# Patient Record
Sex: MALE | Employment: UNEMPLOYED | URBAN - METROPOLITAN AREA
[De-identification: names, ages, dates, MRNs, and addresses within clinical notes are randomized per-mention and may not be internally consistent; named-entity substitution may affect disease eponyms.]

---

## 2019-07-31 ENCOUNTER — OFFICE VISIT (OUTPATIENT)
Dept: UROLOGY | Age: 54
End: 2019-07-31

## 2019-07-31 VITALS
HEIGHT: 65 IN | DIASTOLIC BLOOD PRESSURE: 80 MMHG | BODY MASS INDEX: 25.49 KG/M2 | HEART RATE: 85 BPM | SYSTOLIC BLOOD PRESSURE: 126 MMHG | WEIGHT: 153 LBS | OXYGEN SATURATION: 95 %

## 2019-07-31 DIAGNOSIS — R97.20 ELEVATED PSA: Primary | ICD-10-CM

## 2019-07-31 RX ORDER — AMLODIPINE BESYLATE 10 MG/1
TABLET ORAL
Refills: 3 | COMMUNITY
Start: 2019-07-28 | End: 2021-02-22

## 2019-07-31 RX ORDER — CHLORTHALIDONE 25 MG/1
TABLET ORAL
Refills: 3 | COMMUNITY
Start: 2019-07-28 | End: 2021-02-22

## 2019-07-31 RX ORDER — METFORMIN HYDROCHLORIDE 500 MG/1
TABLET ORAL
Refills: 3 | COMMUNITY
Start: 2019-07-10 | End: 2021-02-22

## 2019-07-31 RX ORDER — GUAIFENESIN 100 MG/5ML
81 LIQUID (ML) ORAL DAILY
Refills: 0 | COMMUNITY
Start: 2019-07-08 | End: 2020-01-23

## 2019-07-31 RX ORDER — PRAVASTATIN SODIUM 20 MG/1
20 TABLET ORAL
COMMUNITY
Start: 2019-07-28 | End: 2021-02-22

## 2019-07-31 NOTE — PROGRESS NOTES
Mr. Maine Youssef has a reminder for a \"due or due soon\" health maintenance. I have asked that he contact his primary care provider for follow-up on this health maintenance.

## 2019-07-31 NOTE — PATIENT INSTRUCTIONS
Prostate Biopsy: About This Test 
What is it? A prostate biopsy is a type of test. Your doctor takes small tissue samples from your prostate gland. Then another doctor looks at the tissue under a microscope to see if there are cancer cells. This test is done by a doctor who specializes in men's genital and urinary problems (urologist). It can be done in your doctor's office, a day surgery clinic, or a hospital operating room. To get the tissue samples from the prostate, the doctor inserts a thin needle through the rectum, the urethra, or the area between the anus and scrotum (perineum). The most common method is through the rectum. Your doctor may use ultrasound to help guide the needle. Why is this test done? You may need a prostate biopsy if your doctor found something of concern during a digital rectal exam. Or you may need it if a blood test showed a high level of prostate-specific antigen (PSA). A biopsy can help find out if you have prostate cancer. How can you prepare for this test? 
Before you have a prostate biopsy, tell your doctor if you are taking any medicines or using any herbal supplements, or if you are allergic to any medicines. And tell your doctor if you have had bleeding problems, or you take aspirin or some other blood thinner. What happens before the test? 
· You may need to have an enema before the test. 
· You will need to take off all or most of your clothes. You will be given a cloth or paper gown to use during the test. 
· You may be given a sedative through a vein (IV) in your arm. The sedative will help you relax and stay still. What happens during the test? 
Some men have an MRI of the prostate before their biopsy. This helps to find the areas in the prostate to take biopsy samples. If you have an MRI, your doctor will use ultrasound and the MRI results to find the areas to biopsy. Through the rectum · You may be asked to kneel, lie on your side, or lie on your back. · Your doctor may inject an anesthetic around the prostate to numb the area before the sample is taken. · Ultrasound is often used to guide the needle to the correct spot. · Your doctor may choose to use a needle guide for the biopsy. He or she will attach the guide to a finger. Your doctor will insert the finger into your rectum. · The needle will enter the prostate and take 6 to 12 samples. Through the urethra · You will lie on your back. Your feet will rest in stirrups. · You will get anesthesia. The anesthesia may make you sleep. Or it may just numb the area being worked on. 
· Your doctor will insert a lighted scope (cystoscope) into your urethra. The scope allows your doctor to look directly at the prostate. Your doctor will pass a cutting loop through the scope to remove samples of prostate tissue. Through the perineum · You will lie on an exam table either on your side or on your back with your knees bent. You will get anesthesia. The anesthesia may make you sleep. Or it may just numb the area being worked on. 
· Your doctor will make a small cut in your perineum. Then he or she will insert a finger into the rectum to hold the prostate. · Your doctor will then insert the needle through the cut and into the prostate. · The needle collects samples of tissue and is then pulled out. What else should you know about this test? 
· A prostate biopsy has a slight risk of causing problems such as infection or bleeding. · If the biopsy went through your rectum, you may have a small amount of bleeding from your rectum for 2 to 3 days after the biopsy. · You may have a little pain in your pelvic area. You may also have a little blood in your urine for 1 to 5 days. · You may have some blood in your semen for a week or longer. How long will the test take? This test will take about 15 to 45 minutes.  
What happens after the test? 
Your doctor will tell you what to expect and watch for after your test. In general: · If you have anesthesia that makes you sleep, you will be in a recovery room for a few hours. You will need someone to drive you home. You may feel tired for the rest of the day. · You will probably be able to go home right away. · If your doctor had you stop taking any medicine for the biopsy, ask him or her when you can start taking it again. If you were given antibiotics, take them as directed. · Do not do heavy work or exercise for 4 hours after the test. 
· Your doctor will tell you how long it may take to get your results back. Follow-up care is a key part of your treatment and safety. Be sure to make and go to all appointments, and call your doctor if you are having problems. It's also a good idea to keep a list of the medicines you take. Ask your doctor when you can expect to have your test results. Where can you learn more? Go to http://jose-susan.info/. Enter Y504 in the search box to learn more about \"Prostate Biopsy: About This Test.\" Current as of: December 19, 2018 Content Version: 12.1 © 0356-1257 [employer]. Care instructions adapted under license by [employer] (which disclaims liability or warranty for this information). If you have questions about a medical condition or this instruction, always ask your healthcare professional. [address] any warranty or liability for your use of this information.

## 2019-08-01 LAB
BILIRUB UR QL STRIP: NEGATIVE
GLUCOSE UR-MCNC: NEGATIVE MG/DL
KETONES P FAST UR STRIP-MCNC: NEGATIVE MG/DL
PH UR STRIP: 5 [PH] (ref 4.6–8)
PROT UR QL STRIP: NEGATIVE
SP GR UR STRIP: 1.01 (ref 1–1.03)
UA UROBILINOGEN AMB POC: NORMAL (ref 0.2–1)
URINALYSIS CLARITY POC: CLEAR
URINALYSIS COLOR POC: YELLOW
URINE BLOOD POC: NORMAL
URINE LEUKOCYTES POC: NORMAL
URINE NITRITES POC: NEGATIVE

## 2019-08-01 NOTE — PROGRESS NOTES
Maria Isabel Mills 47 y.o. male     Mr. Bre Spence seen today for evaluation of elevated PSA found on routine testing                  PSA 23.2 on 8 July 2019    Patient has no irritative or obstructive voiding symptoms  No history of  tract disease, trauma, or surgery  No family history of prostate malignancy    PSA 23.2 on 8 July 2019    Review of Systems:   CNS: No seizure syncope headaches dizziness or visual changes  Respiratory: No wheezing shortness of breath chest pain or coughing  Cardiovascular: Cardiac arrhythmia angina/hypertension  Intestinal: No dyspepsia diarrhea or constipation  Urinary: Urgency frequency dysuria or gross hematuria  Skeletal: Joint arthritis  Endocrine: Diabetes  Other:    Allergies: No Known Allergies   Medications:    Current Outpatient Medications   Medication Sig Dispense Refill    pravastatin (PRAVACHOL) 20 mg tablet       aspirin 81 mg chewable tablet chew ONE tablet BY MOUTH EVERY DAY  0    amLODIPine (NORVASC) 10 mg tablet TAKE ONE TABLET BY MOUTH EVERY DAY  3    metFORMIN (GLUCOPHAGE) 500 mg tablet TAKE ONE TABLET BY MOUTH EVERY MORNING  3    chlorthalidone (HYGROTEN) 25 mg tablet TAKE ONE TABLET BY MOUTH EVERY DAY  3       Past Medical History:   Diagnosis Date    Diabetes ([facility] [address]     Fast heart beat     Hypertension       History reviewed. No pertinent surgical history. Social History     Socioeconomic History    Marital status: UNKNOWN     Spouse name: Not on file    Number of children: Not on file    Years of education: Not on file    Highest education level: Not on file   Occupational History    Not on file   Social Needs    Financial resource strain: Not on file    Food insecurity:     Worry: Not on file     Inability: Not on file    Transportation needs:     Medical: Not on file     Non-medical: Not on file   Tobacco Use    Smoking status: Former Smoker    Smokeless tobacco: Never Used   Substance and Sexual Activity    Alcohol use:  Yes    Drug use: Never  Sexual activity: Yes   Lifestyle    Physical activity:     Days per week: Not on file     Minutes per session: Not on file    Stress: Not on file   Relationships    Social connections:     Talks on phone: Not on file     Gets together: Not on file     Attends [employer] witness service: Not on file     Active member of club or organization: Not on file     Attends meetings of clubs or organizations: Not on file     Relationship status: Not on file    Intimate partner violence:     Fear of current or ex partner: Not on file     Emotionally abused: Not on file     Physically abused: Not on file     Forced sexual activity: Not on file   Other Topics Concern    Not on file   Social History Narrative    Not on file      History reviewed. No pertinent family history.      Physical Examination: Well-nourished mature male in no apparent distress trim and fit appearing     Neck: No masses nodes or bruits  Lungs: Clear breath sounds bilaterally no wheezes no rales no rhonchi  Heart: Regular sinus rhythm no murmurs no gallops no rubs  Abdomen: Nontender no palpable masses no organomegaly no bruits Back: No percussion CVA tenderness  Skin: Warm and dry no rash no lesions  Neurologic: No motor or sensory deficits in arms or legs  Genitalia: Penis is normal, testes normal bilaterally, prostate by SUSHMA is rounded, smooth, benign consistency nontender-no induration no nodularity  No rectal masses induration or tenderness    Urinalysis: Negative dipstick/++heme/negative nitrite      Impression: Elevated PSA    Plan: Transperineal prostate biopsy    Counseling Note:  Technique a transperineal prostate biopsy has been described discussed with patient including risk of bleeding, infection, and urinary retention    Schedule prostate biopsy at [facility] under general anesthesia-patient's preference    RTC 1 week post op prostate biopsy pathology report       Enrique Amato MD  -electronically signed-    PLEASE NOTE:  This document has been produced using voice recognition software. Unrecognized errors in transcription may be present.

## 2019-08-06 RX ORDER — SODIUM CHLORIDE 9 MG/ML
100 INJECTION, SOLUTION INTRAVENOUS CONTINUOUS
Status: CANCELLED | OUTPATIENT
Start: 2019-08-06

## 2019-08-14 ENCOUNTER — DOCUMENTATION ONLY (OUTPATIENT)
Dept: UROLOGY | Age: 54
End: 2019-08-14

## 2019-08-14 ENCOUNTER — OFFICE VISIT (OUTPATIENT)
Dept: PULMONOLOGY | Age: 54
End: 2019-08-14

## 2019-08-14 ENCOUNTER — HOSPITAL ENCOUNTER (OUTPATIENT)
Dept: PREADMISSION TESTING | Age: 54
Discharge: HOME | End: 2019-08-14
Payer: MEDICAID

## 2019-08-14 VITALS
SYSTOLIC BLOOD PRESSURE: 130 MMHG | TEMPERATURE: 98 F | OXYGEN SATURATION: 100 % | HEART RATE: 73 BPM | RESPIRATION RATE: 19 BRPM | HEIGHT: 65 IN | BODY MASS INDEX: 26.16 KG/M2 | DIASTOLIC BLOOD PRESSURE: 80 MMHG | WEIGHT: 157 LBS

## 2019-08-14 DIAGNOSIS — R00.2 PALPITATIONS: ICD-10-CM

## 2019-08-14 DIAGNOSIS — I20.8 ANGINA OF EFFORT (HCC): ICD-10-CM

## 2019-08-14 DIAGNOSIS — R97.20 ELEVATED PSA: ICD-10-CM

## 2019-08-14 DIAGNOSIS — Z01.818 PREOPERATIVE TESTING: Primary | ICD-10-CM

## 2019-08-14 DIAGNOSIS — Z01.818 PREOPERATIVE TESTING: ICD-10-CM

## 2019-08-14 DIAGNOSIS — R06.09 DYSPNEA ON EXERTION: ICD-10-CM

## 2019-08-14 DIAGNOSIS — J44.9 COPD, GROUP B, BY GOLD 2017 CLASSIFICATION (HCC): Primary | ICD-10-CM

## 2019-08-14 DIAGNOSIS — Z87.891 PERSONAL HISTORY OF TOBACCO USE, PRESENTING HAZARDS TO HEALTH: ICD-10-CM

## 2019-08-14 LAB
ANION GAP SERPL CALC-SCNC: 8 MMOL/L (ref 3–18)
ATRIAL RATE: 76 BPM
BUN SERPL-MCNC: 20 MG/DL (ref 7–18)
BUN/CREAT SERPL: 19 (ref 12–20)
CALCIUM SERPL-MCNC: 9.7 MG/DL (ref 8.5–10.1)
CALCULATED P AXIS, ECG09: 54 DEGREES
CALCULATED R AXIS, ECG10: 47 DEGREES
CALCULATED T AXIS, ECG11: 21 DEGREES
CHLORIDE SERPL-SCNC: 105 MMOL/L (ref 100–111)
CO2 SERPL-SCNC: 27 MMOL/L (ref 21–32)
CREAT SERPL-MCNC: 1.04 MG/DL (ref 0.6–1.3)
DIAGNOSIS, 93000: NORMAL
ERYTHROCYTE [DISTWIDTH] IN BLOOD BY AUTOMATED COUNT: 15.8 % (ref 11.6–14.5)
GLUCOSE SERPL-MCNC: 89 MG/DL (ref 74–99)
HCT VFR BLD AUTO: 34 % (ref 36–48)
HGB BLD-MCNC: 10.9 G/DL (ref 13–16)
MCH RBC QN AUTO: 25.6 PG (ref 24–34)
MCHC RBC AUTO-ENTMCNC: 32.1 G/DL (ref 31–37)
MCV RBC AUTO: 79.8 FL (ref 74–97)
P-R INTERVAL, ECG05: 174 MS
PLATELET # BLD AUTO: 1029 K/UL (ref 135–420)
PMV BLD AUTO: 8.9 FL (ref 9.2–11.8)
POTASSIUM SERPL-SCNC: 4.4 MMOL/L (ref 3.5–5.5)
Q-T INTERVAL, ECG07: 374 MS
QRS DURATION, ECG06: 84 MS
QTC CALCULATION (BEZET), ECG08: 420 MS
RBC # BLD AUTO: 4.26 M/UL (ref 4.7–5.5)
SODIUM SERPL-SCNC: 140 MMOL/L (ref 136–145)
VENTRICULAR RATE, ECG03: 76 BPM
WBC # BLD AUTO: 9.3 K/UL (ref 4.6–13.2)

## 2019-08-14 PROCEDURE — 36415 COLL VENOUS BLD VENIPUNCTURE: CPT

## 2019-08-14 PROCEDURE — 80048 BASIC METABOLIC PNL TOTAL CA: CPT

## 2019-08-14 PROCEDURE — 85027 COMPLETE CBC AUTOMATED: CPT

## 2019-08-14 PROCEDURE — 93005 ELECTROCARDIOGRAM TRACING: CPT

## 2019-08-14 NOTE — PROGRESS NOTES
I called and spoke to patient's primary care's nurse. I notified her that the patient had surgery scheduled for Friday that had to be canceled due to the need for cardiac and pulmonary remy. Now the patient also needs clearance from his primary care for a critical value lab called to us of platelet of [phone]. I faxed over the lab results to the patient's PCP Dr. Lisset Wallace. They will contact patient.   I also called and left voicemail for patient to call me back and I will give him the name of a cardiologist.

## 2019-08-14 NOTE — LETTER
8/18/19 Patient: Aubrie Dior YOB: 1965 Date of Visit: 8/14/2019 Francisco North MD 
[address] [place] [id number] [place] [address] VIA Facsimile: [phone] Dear Francisco North MD, Thank you for referring Mr. Aubrie Dior to [facility] [place] for evaluation. My notes for this consultation are attached. If you have questions, please do not hesitate to call me. I look forward to following your patient along with you. Sincerely, Mariella Macias MD

## 2019-08-14 NOTE — PROGRESS NOTES
[address] [facility], [address] [facility]  Pulmonary, Critical Care, and Sleep Medicine    Pulmonary Office Initial Consultation  Name: Zach King 47 y.o. male  MRN: [id number]  : 1965  Service Date: 19    Referring Provider: Mateus Kwok MD  [address]  [place], [address] [place]   Chief Complaint:   Chief Complaint   Patient presents with   [facility] Referral / Consult     referred by Dr. Silva Bloch for COPD    COPD     History of Present Illness: Zach King is a 47 y.o. male, who presents to Pulmonary clinic referred for COPD. Pt reports some shortness of breath on a daily basis for the last few months. Pt reports sx with strenuous exertion while he's at work - pt works night shift. mMRC of 2. Associated with discomfort/angina, substernal, pressure, 1/10, non-radiating, occurs intermittently. Pt reports that he was seen by the [facility] clinic in [place] and was started on Albuterol PRN, he used it infrequently, used it about 5-6 times last month. It helped with his cough at the time. He only uses it at night. Symptoms are alleviated with rest and mildly with albuterol, no other modifying factors. No issues with chronic cough  Pt had an episode of bronchitis/COPD exacerbation - [facility] to [facility] about 1 month ago. Pt reports palpitations have been occurring on a regular basis with minimal effort at least 3 times a week for the last 4-5 months -- not progressively worsening. Symptoms are self-limited, last a few minutes. No modifying factors. Smoking hx:  Quit 1 month ago, prior 1PPD, started at age 15 -- 36 pack years  Occ Hx: Works at 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       Past Medical Hx: I have personally reviewed medical hx  Past Medical History: Diagnosis Date    Chronic lung disease     [facility]     Diabetes ([facility]     Diabetes mellitus ([facility]     Fast heart beat     periodically    Hypertension        Past Surgical Hx: I have personally reviewed surgical hx  Past Surgical History:   Procedure Laterality Date    HX OTHER SURGICAL      ? cyst  lower back        Family Hx: I have personally reviewed the family hx. No family hx of cancer or hereditary lung disease with immediate family. Family History   Problem Relation Age of Onset    Diabetes Mother     Heart Disease Mother         CHF    Diabetes Sister     Diabetes Brother        Social Hx: I have personally reviewed the social hx.   Social History     Socioeconomic History    Marital status: SINGLE     Spouse name: Not on file    Number of children: Not on file    Years of education: Not on file    Highest education level: Not on file   Occupational History    Not on file   Social Needs    Financial resource strain: Not on file    Food insecurity:     Worry: Not on file     Inability: Not on file    Transportation needs:     Medical: Not on file     Non-medical: Not on file   Tobacco Use    Smoking status: Former Smoker     Packs/day: 1.00     Years: 15.00     Pack years: 15.00     Last attempt to quit: 2019     Years since quittin.6    Smokeless tobacco: Never Used   Substance and Sexual Activity    Alcohol use: Yes     Comment: oc    Drug use: Not Currently     Types: Marijuana    Sexual activity: Yes   Lifestyle    Physical activity:     Days per week: Not on file     Minutes per session: Not on file    Stress: Not on file   Relationships    Social connections:     Talks on phone: Not on file     Gets together: Not on file     Attends Restorationist service: Not on file     Active member of club or organization: Not on file     Attends meetings of clubs or organizations: Not on file     Relationship status: Not on file   Romayne Hoffman Intimate partner violence:     Fear of current or ex partner: Not on file     Emotionally abused: Not on file     Physically abused: Not on file     Forced sexual activity: Not on file   Other Topics Concern    Not on file   Social History Narrative    Not on file       Allergies: I have reviewed the allergy hx  No Known Allergies    Medications:  I have reviewed the patient's medications  Prior to Admission medications    Medication Sig Start Date End Date Taking? Authorizing Provider   pravastatin (PRAVACHOL) 20 mg tablet Take 20 mg by mouth nightly. 7/28/19  Yes Provider, Historical   aspirin 81 mg chewable tablet Take 81 mg by mouth daily. 7/8/19  Yes Provider, Historical   amLODIPine (NORVASC) 10 mg tablet TAKE ONE TABLET BY MOUTH EVERY DAY 7/28/19  Yes Provider, Historical   metFORMIN (GLUCOPHAGE) 500 mg tablet TAKE ONE TABLET BY MOUTH EVERY MORNING 7/10/19  Yes Provider, Historical   chlorthalidone (HYGROTEN) 25 mg tablet TAKE ONE TABLET BY MOUTH EVERY DAY 7/28/19  Yes Provider, Historical       Immunizations:  I have reviewed the patient's immunizations    There is no immunization history on file for this patient. Review of Systems:  A complete review of systems was performed as stated in the HPI, all others are negative.       Objective:    Physical Exam:  /80 (BP 1 Location: Left arm, BP Patient Position: At rest)   Pulse 73   Temp 98 °F (36.7 °C) (Oral)   Resp 19   Ht 5' 5\" (1.651 m)   Wt 71.2 kg (157 lb)   SpO2 100%   BMI 26.13 kg/m²   Vitals were personally reviewed  Gen: no acute distress, pleasant and cooperative, sitting up in chair, able to climb to exam table w/o difficulty  HEENT: normocephalic/atraumatic, PERRLA, EOMI, no scleral icterus, nasal turbinates have no erythema, no nasal polyps, no oral lesions, fair dentition, Mallampati III  Neck: supple, trachea midline, no JVD, no cervical and supraclavicular adenopathy  Chest: no lesions, with even rise and fall, no pectus excavatum or flail chest  CVS: regular rate rhythm, S1/S2, no murmurs/rubs/gallops  Lungs: good air entry B/L, CTABL, no wheezes/rales/rhonchi  Back: no kyphosis or scoliosis  Abdomen: soft, nontender, bowel sounds present, no hepatosplenomegaly  Ext: no pitting edema B/L, no peripheral cyanosis or clubbing  Neuro: grossly normal, AAOx3, normal strength and coordination grossly, no focal deficits  Skin: no rashes, erythema, lesions  Psych: normal memory, thought content, and processing    Labs: I have reviewed the patient's available labs  Labs from 6/12/2019 reviewed from care everywhere, CBC shows mild anemia with hemoglobin of 10.3, white count was elevated 19.5, platelet count severely elevated at 1776. Review of prior platelet counts show normal platelet count on 7/95/6553, then progressively elevated between 8/4/2018 and 12/26/2018 from 500 to 1600    Outside records reviewed in clinic as follows: Patient last seen his PCP, Dr. Alvaro Ball on 7/8/2019. Patient presented for hospital discharge follow-up. For COPD, patient was started on Ventolin and referred to pulmonary for further work-up. Imaging:  I have personally reviewed patient's imaging --none on file. Reviewed CT report done at [facility] from 6/11/2019, reporting bronchitis and underlying emphysema, with no pulmonary embolism  Official report per radiology:  Result Impression     No pulmonary embolism. Bronchitis. Underlying emphysema. Staghorn calculus right kidney has grown since 2014. Signed By: Héctor Ritchie MD on 6/11/2019 11:03 AM   Result Narrative   EXAM: CTA CHEST PULMONARY EMBOLISM     CLINICAL INDICATION/HISTORY: Chest pain, acute, PE suspected, high pretest prob patient presents with cough and cold-like symptoms for 7 to 8 days. Chest pain radiating to the arm. Low oxygen saturation on room air.     COMPARISON: Chest x-ray same day    TECHNIQUE: CTA of the chest was performed using timing optimized for pulmonary embolism technique, with 100 cc of Omnipaque 350 IV contrast.  To maximize sensitivity the sagittal and coronal reconstructions were created using a 3D multislice MIP (maximal intensity projection) methodology. CT scans at this facility are performed using dose optimization technique as appropriate to the performed exam, to include automated exposure control, adjustment of the mA and/or kV according to patient size (including appropriate matching for site specific examinations), or use of iterative reconstruction technique. FINDINGS:  Pulmonary arteries: No filling defects are appreciated within the main, lobar or visualized segmental pulmonary arteries to suggest embolism. There is patient motion that diminishes the sensitivity of this test for detection of abnormalities. Aorta and other cardiovascular structures: No aortic aneurysm or dissection. Heart Strain assessment:  -  RV/LV ratio (normal <0.9): Normal  -  Dysfunction or bowing of interventricular septum: None  -  Main pulmonary artery enlargement (>30mm): Not present  -  There  is not visualization of contrast reflux from the right heart into the IVC/hepatic veins. Lung/Airway:  No areas of dense consolidation. There is significant thickening of the central peribronchovascular interstitium around the jackson there are multiple partially opacified bronchi in right middle lobe, right lower lobe, left upper lobe. Paraseptal emphysema at the apices. Pleura:  No pleural effusion. Lymph nodes: Multiple lymph nodes in the middle mediastinum, both jackson, few prevascular lymph nodes. None pathologically enlarged. Nonenlarged bilateral axillary lymph nodes. Chest wall and lower neck: Negative. Upper abdominal structures: There is a large branching stone upper pole right kidney measuring 27 mm units and averaging 337-548 Hounsfield units. . The left kidney appears larger than the right. Bones: Negative.    Other Result Information   Interface, [employer] [facility] - 06/11/2019 11:05 AM EDT  EXAM: CTA CHEST PULMONARY EMBOLISM     CLINICAL INDICATION/HISTORY: Chest pain, acute, PE suspected, high pretest prob patient presents with cough and cold-like symptoms for 7 to 8 days. Chest pain radiating to the arm. Low oxygen saturation on room air. COMPARISON: Chest x-ray same day    TECHNIQUE: CTA of the chest was performed using timing optimized for pulmonary embolism technique, with 100 cc of Omnipaque 350 IV contrast.  To maximize sensitivity the sagittal and coronal reconstructions were created using a 3D multislice MIP (maximal intensity projection) methodology. CT scans at this facility are performed using dose optimization technique as appropriate to the performed exam, to include automated exposure control, adjustment of the mA and/or kV according to patient size (including appropriate matching for site specific examinations), or use of iterative reconstruction technique. FINDINGS:  Pulmonary arteries: No filling defects are appreciated within the main, lobar or visualized segmental pulmonary arteries to suggest embolism. There is patient motion that diminishes the sensitivity of this test for detection of abnormalities. Aorta and other cardiovascular structures: No aortic aneurysm or dissection. Heart Strain assessment:  -  RV/LV ratio (normal <0.9): Normal  -  Dysfunction or bowing of interventricular septum: None  -  Main pulmonary artery enlargement (>30mm): Not present  -  There  is not visualization of contrast reflux from the right heart into the IVC/hepatic veins. Lung/Airway:  No areas of dense consolidation. There is significant thickening of the central peribronchovascular interstitium around the jackson there are multiple partially opacified bronchi in right middle lobe, right lower lobe, left upper lobe. Paraseptal emphysema at the apices. Pleura:  No pleural effusion.     Lymph nodes: Multiple lymph nodes in the middle mediastinum, both jackson, few prevascular lymph nodes. None pathologically enlarged. Nonenlarged bilateral axillary lymph nodes. Chest wall and lower neck: Negative. Upper abdominal structures: There is a large branching stone upper pole right kidney measuring 27 mm units and averaging 337-548 Hounsfield units. . The left kidney appears larger than the right. Bones: Negative. IMPRESSION    No pulmonary embolism. Bronchitis. Underlying emphysema. Staghorn calculus right kidney has grown since 2014. Signed By: Kellen Casanova MD on 6/11/2019 11:03 AM       PFTs:  I have reviewed the patient's PFTs done today in clinic, spirometry is normal.  No BD response. TTE:  I have reviewed the patient's TTE results, done at [facility] on 6/12/2019  Result Impression   :   NORMAL GLOBAL LEFT VENTRICULAR SYSTOLIC FUNCTION. NORMAL LEFT VENTRICULAR EJECTION   FRACTION 65%. NO WALL MOTION ABNORMALITIES. NORMAL DIASTOLIC FUNCTION. NORMAL PULMONARY ARTERY PRESSURE OF   21MMHG. NO HEMODYNAMICALLY SIGNIFICANT VALVULAR PATHOLOGY. NO PREVIOUS REPORT FOR COMPARISON.        Clinical Indications: Heart Failure, Initial evaluation of known or suspected (systolic or diastolic)   ICD Codes: Technical Quality: Adequate     MEASUREMENTS:   2D ECHO    RV Diameter                       3.4 ET                7.2-8.5   LV Diastolic Diameter Base LX     4.9 UA                0.9-9.3   LV Systolic Diameter Base RP      6.0 FA                5.1-6.4   IVS Diastolic Thickness           0.8 cm                0.6-1.1 cm   LVPW Diastolic [id number]          7.5 HL                3.3-8.3 cm   LA Systolic Diameter LX           3.4 cm                2.1-3.7 cm   Aortic Root Diameter              3 FC                  0.9-1.1 cm   LA Systolic VYACJVRE              62.1 mmHg   Ascending Aorta Diameter          3.1 cm   LA Area 4C View                   17.8 cm²   LA Area 2C View                   17.1 cm²   LA Length 4C                      4.3 cm   LA Length 2C                      5.2 cm   LA Volume                         59 cm³       DOPPLER    LVOT Diameter                     2.3 cm   LVOT Area                         4.2 cm²   Mitral E Point Velocity           94 cm/s   Mitral  A Point Velocity          108 cm/s   Mitral A Duration                 108 ms   Mitral E to A Ratio               0.87                  1.0 to 1.5   Mitral E to LV E' Septal Ratio    7.8   Mitral E to LV E' Lateral Ratio   7   MV Deceleration Time              88.5 ms               160-240 ms   Isovolumic Relaxation Time        78 ms                 70-90 ms   Pulm Vein Atrial Reversal Veloci  43.9 cm/s             <35 cm/s   Pulm Vein Atrial Duration         66 ms   E Prime Velocity                  13.5 cm/s   RA Pressure (Entered Value)       3 mmHg   TR Peak Velocity                  2.1 m/s   TR Peak EEYLJHSG                  23.5 mmHg   RV Systolic MLEBYPXW              53.6 mmHg       FINDINGS:     Left Ventricle: Normal left ventricular cavity size. Normal left ventricular wall thickness. Left Ventricle Normal global left ventricular systolic function. Normal left ventricular ejection fraction. Function: No wall motion abnormalities. Normal diastolic function. LVEF: 65 %       Left Atrium: The left atrium is normal in size. Left atrial volume index 33mLm2. Right Ventricle: The right ventricle is normal in size. Normal right ventricular function. Tricuspid Annular Plane Systolic Excursion (TAPSE) is 2.74cm. Tricuspid Annular Plane Systolic   Velocity (TAPSV) is 18.3cm/s. Right Atrium: The right atrium is normal in size. Normal inferior vena cava size and collapse. Mitral Valve: The mitral leaflets are normal.   Trace of mitral regurgitation. No stenosis. Aortic Valve: The aortic valve leaflets are normal. No regurgitation or stenosis. Tricuspid Valve:  The tricuspid valve is structurally normal.   Trace of tricuspid regurgitation. Normal pulmonary artery pressure of   21mmHg. No stenosis. Pulmonic Valve: The pulmonic valve is structurally normal. No regurgitation or stenosis. Aorta: The aorta is normal.   Pericardium: No pericardial effusion. Masses / Shunts: No masses, shunts or thrombi seen. Amparo Larios MD   (Electronically Signed)   Final Date: 12 June 2019 17:43       Assessment and Plan:  47 y.o. male with:    Impression:  1. COPD/emphysema, gold group B, with recent exacerbation  2. Angina and palpitations: Etiology unclear. Patient is higher risk given smoking history  3. Dyspnea on exertion: Etiology due to above, cannot rule out cardiac processes well. 4.  History of tobacco use: Quit in June 2019, prior 1PPD, started at age 15 -- 36 pack years    Plan:  -Given ongoing angina and palpitations, will refer the patient to cardiology at this time for further work-up  -CT lung cancer screening ordered for June 2020. Shared decision-making performed. Advised patient to get yearly until age 78.  -Start Stiolto Respimat 2 puffs once daily, samples given in clinic.  -Continue albuterol as needed  -Advised patient remain active  -Congratulated patient on smoking abstinence      Follow-up and Dispositions    · Return in about 3 months (around 11/14/2019).          Orders Placed This Encounter    AMB POC SPIROMETRY W/BRONCHODILATOR    CT LOW DOSE LUNG CANCER SCREENING    REFERRAL TO CARDIOLOGY    tiotropium-olodaterol ([address]) 2.5-2.5 mcg/actuation inhaler    tiotropium-olodaterol (STIOLTO RESPIMAT) 2.5-2.5 mcg/actuation inhaler         Isael Mccann MD/MPH     Pulmonary, Critical Care Medicine  [facility]

## 2019-08-14 NOTE — PROGRESS NOTES
Phone call from Dhiraj Israel  and [facility], calling in a critical value on patient. Platelet count is 6379. Dr. Danielle Sanchez notified.

## 2019-08-14 NOTE — PROGRESS NOTES
Verbal Order with read back per Marisela Ortiz MD  For PFT smart panel. AMB POC PFT complete w/ bronchodilator    Dr. Helen Crook MD will co-sign the orders.

## 2019-08-14 NOTE — PROGRESS NOTES
I called the patient to notify him we need to cancel his surgery for Friday, 8/16/19. Anesthesia will not put him under until he has a cardiac remy, pulmonary remy and his primary care must clear him because his platelet count was very high. The patient asked for a name of a Cardiologist and I told him I will call him back with a suggestion. Patient verbalized understanding.

## 2019-08-15 ENCOUNTER — DOCUMENTATION ONLY (OUTPATIENT)
Dept: UROLOGY | Age: 54
End: 2019-08-15

## 2019-08-15 NOTE — PROGRESS NOTES
Patient returned my call and I gave him the phone number to call Cardiovascular Specialist at [phone] to make an appointment to have his cardiac clearance. Patient verbalized understanding and stated he would call them and make an appointment.

## 2019-08-15 NOTE — PROGRESS NOTES
I received a call back from patient's primary care office stating that I would need to contact [facility] and alert them of the critical lab value. I called [facility] and spoke with Monica Kevin. I faxed lab to [phone]. Monica Kevin stated that the platelet count of 5161 is actually down from last time. The physician is out of the office but they will make sure the physician sees it tomorrow.

## 2019-08-15 NOTE — PROGRESS NOTES
Received phone call from [facility] with [facility]. She stated the doctor wants the patient to hold his Aspirin for now and to come into their facility and have labs redrawn in order for them to give him a letter of clearance. I call the patient and left a voicemail notifying of above. I asked the patient to please call me back and let me know he got the message. I will try to call the patient back again this afternoon if I have not heard back from him.

## 2019-08-16 ENCOUNTER — DOCUMENTATION ONLY (OUTPATIENT)
Dept: UROLOGY | Age: 54
End: 2019-08-16

## 2019-08-16 NOTE — PROGRESS NOTES
I called the patient and notified him [facility] wants him to hold his Aspirin for now. Patient states he has an appointment with them on Wednesday, 8/21/19 and he will have repeat lab work at that time. Patient also stated he did get my message leaving him the name and number for cardiologist group and he will call them to set up appointment for cardiac clearance. Patient verbalized understanding.

## 2019-09-13 ENCOUNTER — OFFICE VISIT (OUTPATIENT)
Dept: CARDIOLOGY CLINIC | Age: 54
End: 2019-09-13

## 2019-09-13 VITALS
HEART RATE: 82 BPM | DIASTOLIC BLOOD PRESSURE: 66 MMHG | OXYGEN SATURATION: 99 % | WEIGHT: 158 LBS | BODY MASS INDEX: 26.33 KG/M2 | HEIGHT: 65 IN | SYSTOLIC BLOOD PRESSURE: 120 MMHG

## 2019-09-13 DIAGNOSIS — R00.2 PALPITATIONS: ICD-10-CM

## 2019-09-13 DIAGNOSIS — R07.9 CHEST PAIN, UNSPECIFIED TYPE: Primary | ICD-10-CM

## 2019-09-13 NOTE — PATIENT INSTRUCTIONS
Stress echo   Follow up as needed  If you have not heard from the central scheduler to schedule your testing in 48 hours, please call [phone].

## 2019-09-13 NOTE — PROGRESS NOTES
Nyo Easton    CC: Referred by Pulm & Anesthesia for CP and Palpitations/ Preop    HPI    Noy Easton is a 47 y.o. gentleman recently diagnosed with COPD but here due to complaints of continued palpitations and chest pain. As you know patient was recently hospitalized at [facility] for shortness of breath cough and ultimately bronchitis. He had a change of insurance and so is getting established with a lot of new doctors. He has a somewhat complex medical history and much of the details are unknown at the time of my evaluation I understand he has had a work-up for anemia and very high platelets. Part of this work-up he was noted to have a kidney stone that does not seem to be causing obstructive symptoms. Patient tells me that his chest pain and palpitations actually do not bother him much but every time he mentions it to somebody they are making a big deal about it. He says he supposed to have some type of prostate procedure and an anesthesiologist told him he needs to see a cardiologist before they will schedule anything. In terms of patient's chest pain he says it happens almost every day in the center of the chest when he is usually just sitting down. The pain lasts for seconds and there are no other associated symptoms. The pain does not radiate. As a separate issue to this he also has some palpitations. He notices it the most when he gets up in the middle of the night to use the restroom and says he feels like his heart is racing. Really no pounding fluttering or skipping is just these episodes where he feels his heart rate is faster. Says when he was monitored at [facility] he does recall that is being told his heart rate was faster but no significant arrhythmias were noted. He otherwise does not have any edema no dizziness he is never had any syncope shortness of breath and cough are significantly improved after his COPD has been addressed.   Though he himself does not have known coronary disease he does have several CV risk factors I have identified below. His diabetes he says is prediabetes and is a formal new diagnosis    CV RFs; Tobacco abuse (quitting), preDM, HTN    Past Medical History:   Diagnosis Date    Chronic lung disease     [facility]     Diabetes [facility]     Diabetes mellitus ([facility]     Fast heart beat     periodically    Hypertension        Past Surgical History:   Procedure Laterality Date    HX OTHER SURGICAL  April?/    ? cyst  lower back        Current Outpatient Medications   Medication Sig Dispense Refill    tiotropium-olodaterol (STIOLTO RESPIMAT) 2.5-2.5 mcg/actuation inhaler Take 2 Puffs by inhalation daily. 1 Inhaler 0    pravastatin (PRAVACHOL) 20 mg tablet Take 20 mg by mouth nightly.  aspirin 81 mg chewable tablet Take 81 mg by mouth daily.   0    amLODIPine (NORVASC) 10 mg tablet TAKE ONE TABLET BY MOUTH EVERY DAY  3    metFORMIN (GLUCOPHAGE) 500 mg tablet TAKE ONE TABLET BY MOUTH EVERY MORNING  3    chlorthalidone (HYGROTEN) 25 mg tablet TAKE ONE TABLET BY MOUTH EVERY DAY  3       No Known Allergies    Social History     Socioeconomic History    Marital status: SINGLE     Spouse name: Not on file    Number of children: Not on file    Years of education: Not on file    Highest education level: Not on file   Occupational History    Not on file   Social Needs    Financial resource strain: Not on file    Food insecurity:     Worry: Not on file     Inability: Not on file    Transportation needs:     Medical: Not on file     Non-medical: Not on file   Tobacco Use    Smoking status: Former Smoker     Packs/day: 1.00     Years: 15.00     Pack years: 15.00     Last attempt to quit: 2019     Years since quittin.6    Smokeless tobacco: Never Used   Substance and Sexual Activity    Alcohol use: Yes     Comment: oc    Drug use: Not Currently     Types: Marijuana    Sexual activity: Yes   Lifestyle    Physical activity:     Days per week: Not on file     Minutes per session: Not on file    Stress: Not on file   Relationships    Social connections:     Talks on phone: Not on file     Gets together: Not on file     Attends Jew service: Not on file     Active member of club or organization: Not on file     Attends meetings of clubs or organizations: Not on file     Relationship status: Not on file    Intimate partner violence:     Fear of current or ex partner: Not on file     Emotionally abused: Not on file     Physically abused: Not on file     Forced sexual activity: Not on file   Other Topics Concern    Not on file   Social History Narrative    Not on file    Smoking hx:  Quit 1 month ago?, prior 1PPD, started at age 15 -- 36 pack years    FH: neg premature CAD, no SCD, his mother has CHF? Review of Systems    14 pt Review of Systems is negative unless otherwise mentioned in the HPI. Wt Readings from Last 3 Encounters:   09/13/19 71.7 kg (158 lb)   08/14/19 71.2 kg (157 lb)   07/31/19 69.4 kg (153 lb)     Temp Readings from Last 3 Encounters:   08/14/19 98 °F (36.7 °C) (Oral)     BP Readings from Last 3 Encounters:   09/13/19 120/66   08/14/19 130/80   07/31/19 126/80     Pulse Readings from Last 3 Encounters:   09/13/19 82   08/14/19 73   07/31/19 85     Physical Exam:    Visit Vitals  /66   Pulse 82   Ht 5' 5\" (1.651 m)   Wt 71.7 kg (158 lb)   SpO2 99%   BMI 26.29 kg/m²      Physical Exam   Constitutional: He is oriented to person, place, and time. He appears well-developed and well-nourished. HENT:   Head: Normocephalic and atraumatic. Eyes: Pupils are equal, round, and reactive to light. EOM are normal. No scleral icterus. Neck: No JVD present. Cardiovascular: Normal rate, regular rhythm, normal heart sounds and intact distal pulses. Exam reveals no gallop and no friction rub. No murmur heard. Pulmonary/Chest: Effort normal and breath sounds normal. No respiratory distress. He has no wheezes. He has no rales. He exhibits no tenderness. Abdominal: Soft. Bowel sounds are normal.   Musculoskeletal: He exhibits no edema. Neurological: He is alert and oriented to person, place, and time. Skin: Skin is warm and dry. No rash noted. Psychiatric: He has a normal mood and affect. EKG t6/11/19:  Sinus tach 100, normal axis and intervals, nonspecific t wave abn    No results found for: HBA1C, HGBE8, MDG2PKBU, LRN0LUSA, FOQ9EZCN   Lab Results   Component Value Date/Time    Sodium 140 08/14/2019 01:36 PM    Potassium 4.4 08/14/2019 01:36 PM    Chloride 105 08/14/2019 01:36 PM    CO2 27 08/14/2019 01:36 PM    Anion gap 8 08/14/2019 01:36 PM    Glucose 89 08/14/2019 01:36 PM    BUN 20 (H) 08/14/2019 01:36 PM    Creatinine 1.04 08/14/2019 01:36 PM    BUN/Creatinine ratio 19 08/14/2019 01:36 PM    GFR est AA >60 08/14/2019 01:36 PM    GFR est non-AA >60 08/14/2019 01:36 PM    Calcium 9.7 08/14/2019 01:36 PM     Lab Results   Component Value Date/Time    WBC 9.3 08/14/2019 01:36 PM    HGB 10.9 (L) 08/14/2019 01:36 PM    HCT 34.0 (L) 08/14/2019 01:36 PM    PLATELET 1,933 ([facility]) 81/50/8318 01:36 PM    MCV 79.8 08/14/2019 01:36 PM     TTE:  I have reviewed the patient's TTE results, done at [facility] on 6/12/2019  Result Impression   :  NORMAL GLOBAL LEFT VENTRICULAR SYSTOLIC FUNCTION. NORMAL LEFT VENTRICULAR EJECTION  FRACTION 65%. NO WALL MOTION ABNORMALITIES. NORMAL DIASTOLIC FUNCTION. NORMAL PULMONARY ARTERY PRESSURE OF   21MMHG. NO HEMODYNAMICALLY SIGNIFICANT VALVULAR PATHOLOGY. NO PREVIOUS REPORT FOR COMPARISON. LA normal        Impression and Plan: Mary Troy is a 47 y. o. with:    1.) Perioperative risk stratification prior to prostrate procedure  2.) Racing palpitations  3.) Atypical but persistent CP  4.) Normal LV function/ normal LA recent echo 6/2019  5.) COPD, recent exacerbation  6.) preDM  7.) HTN  8.) Tobacco abuse    1.) Stress echocardiogram  2.) Will call with results, if negative can be prn based on if palpitations bothering him    Though symptoms atypical for ACS his risk is elevated for premature CAD due to multiple CV RFs  Though procedure is elective and sounds low risk, due to increased symptoms will ask him to do a stress test    I'm less concerned for significant arrhythmia as he was monitored during hospitalization and nothing besides sinus tachycardia was documented (including ekgs). We can consider holter monitor in the future but at this point I dont think it would change his management (bc even if having PVCs- would be careful adding BB due to his lung issues). Thank you for allowing me to participate in the care of your patient, please do not hesitate to call with questions or concerns.     Kindest Regards,    Joe Ordoñez, DO

## 2019-11-18 ENCOUNTER — OFFICE VISIT (OUTPATIENT)
Dept: PULMONOLOGY | Age: 54
End: 2019-11-18

## 2019-11-18 VITALS
TEMPERATURE: 98.3 F | OXYGEN SATURATION: 98 % | RESPIRATION RATE: 20 BRPM | DIASTOLIC BLOOD PRESSURE: 90 MMHG | WEIGHT: 158 LBS | HEART RATE: 93 BPM | BODY MASS INDEX: 26.33 KG/M2 | HEIGHT: 65 IN | SYSTOLIC BLOOD PRESSURE: 147 MMHG

## 2019-11-18 DIAGNOSIS — F17.210 CIGARETTE NICOTINE DEPENDENCE WITHOUT COMPLICATION: ICD-10-CM

## 2019-11-18 DIAGNOSIS — Z87.891 PERSONAL HISTORY OF TOBACCO USE, PRESENTING HAZARDS TO HEALTH: ICD-10-CM

## 2019-11-18 DIAGNOSIS — R06.09 DYSPNEA ON EXERTION: ICD-10-CM

## 2019-11-18 DIAGNOSIS — J44.9 ASTHMA-COPD OVERLAP SYNDROME (HCC): Primary | ICD-10-CM

## 2019-11-18 DIAGNOSIS — R00.2 PALPITATIONS: ICD-10-CM

## 2019-11-18 RX ORDER — NICOTINE 7MG/24HR
1 PATCH, TRANSDERMAL 24 HOURS TRANSDERMAL EVERY 24 HOURS
Qty: 30 PATCH | Refills: 0 | Status: SHIPPED | OUTPATIENT
Start: 2019-11-18 | End: 2019-12-18

## 2019-11-18 RX ORDER — IBUPROFEN 200 MG
1 TABLET ORAL EVERY 24 HOURS
Qty: 30 PATCH | Refills: 0 | Status: SHIPPED | OUTPATIENT
Start: 2019-11-18 | End: 2019-12-18

## 2019-11-18 RX ORDER — DM/P-EPHED/ACETAMINOPH/DOXYLAM 30-7.5/3
2 LIQUID (ML) ORAL
Qty: 300 LOZENGE | Refills: 1 | Status: SHIPPED | OUTPATIENT
Start: 2019-11-18

## 2019-11-18 NOTE — PROGRESS NOTES
[address] [facility] [address] [place] [address] [facility]  Pulmonary, Critical Care, and Sleep Medicine    Pulmonary Office Follow-Up  Name: Carlos Eduardo Day 47 y.o. male  MRN: [id number]  : 1965  Service Date: 19  Chief Complaint:   Chief Complaint   Patient presents with    COPD    Other     Dyspnea on exertion. History of Present Illness: Carlos Eduardo Day is a 47 y.o. male, who presents to Pulmonary clinic for follow-up of COPD. Patient was last seen in our clinic on 2019. In the interval, patient saw cardiology, ordered a stress echo. He was not contacted by , so he was referred to [facility] cardiology, underwent abnormal stress testing, and then underwent cardiac catheterization on 2019, which was negative. He continues to report palpitations, occurs on a fairly regular basis. Patient underwent Holter monitor testing, he is awaiting the results of that. Patient reports no new issues with his dyspnea. Occurs with strenuous exertion, mMRC of 2. Patient also reports that he has triggers to dyspnea with dust, some coughing with incense, and secondhand marijuana smoke. Patient reports that he used Stiolto for about a month, and then did not get it filled. Patient reports he has not used any controller medications since that point. No issues with sinus drainage  Patient reports in the interval, he relapsed to smoking. He is currently smoking about 1/5PPD  No PRN albuterol use  No exacerbations in the interval.  Denies fevers, chills, nausea, vomiting, diarrhea, angina, night sweats, hemoptysis, sputum, voice hoarseness, LE swelling.     Past Medical History:   Diagnosis Date    Chronic lung disease     Chronic obstructive pulmonary disease ([address]     [facility]     Diabetes mellitus [facility]     Fast heart beat     periodically    Hypertension      Past Surgical History:   Procedure Laterality Date    HX OTHER SURGICAL   ?cyst  lower back      Family History   Problem Relation Age of Onset    Diabetes Mother     Heart Disease Mother         CHF    Diabetes Sister     Diabetes Brother      Social History     Socioeconomic History    Marital status: SINGLE     Spouse name: Not on file    Number of children: Not on file    Years of education: Not on file    Highest education level: Not on file   Occupational History    Not on file   Social Needs    Financial resource strain: Not on file    Food insecurity:     Worry: Not on file     Inability: Not on file    Transportation needs:     Medical: Not on file     Non-medical: Not on file   Tobacco Use    Smoking status: Light Tobacco Smoker     Packs/day: 0.25     Years: 15.00     Pack years: 3.75     Last attempt to quit: 2019     Years since quittin.8    Smokeless tobacco: Never Used   Substance and Sexual Activity    Alcohol use: Yes     Comment: oc    Drug use: Not Currently     Types: Marijuana    Sexual activity: Yes   Lifestyle    Physical activity:     Days per week: Not on file     Minutes per session: Not on file    Stress: Not on file   Relationships    Social connections:     Talks on phone: Not on file     Gets together: Not on file     Attends Church service: Not on file     Active member of club or organization: Not on file     Attends meetings of clubs or organizations: Not on file     Relationship status: Not on file    Intimate partner violence:     Fear of current or ex partner: Not on file     Emotionally abused: Not on file     Physically abused: Not on file     Forced sexual activity: Not on file   Other Topics Concern    Not on file   Social History Narrative    Not on file       Allergies: I have reviewed the allergy hx  No Known Allergies    Medications:  I have reviewed the patient's medications  Prior to Admission medications    Medication Sig Start Date End Date Taking?  Authorizing Provider   tiotropium-olodaterol (STIOLTO RESPIMAT) 2.5-2.5 mcg/actuation inhaler Take 2 Puffs by inhalation daily. 8/14/19   Helen Sotelo MD   pravastatin (PRAVACHOL) 20 mg tablet Take 20 mg by mouth nightly. 7/28/19   Provider, Historical   aspirin 81 mg chewable tablet Take 81 mg by mouth daily. 7/8/19   Provider, Historical   amLODIPine (NORVASC) 10 mg tablet TAKE ONE TABLET BY MOUTH EVERY DAY 7/28/19   Provider, Historical   metFORMIN (GLUCOPHAGE) 500 mg tablet TAKE ONE TABLET BY MOUTH EVERY MORNING 7/10/19   Provider, Historical   chlorthalidone (HYGROTEN) 25 mg tablet TAKE ONE TABLET BY MOUTH EVERY DAY 7/28/19   Provider, Historical       Immunizations:  I have reviewed the patient's immunizations  Immunization History   Administered Date(s) Administered    Tdap 08/22/2012       Review of Systems:  A complete review of systems was performed as stated in the HPI, all others are negative.       Objective:    Physical Exam:  /90 (BP 1 Location: Right arm, BP Patient Position: Sitting)   Pulse 93   Temp 98.3 °F (36.8 °C) (Oral)   Resp 20   Ht 5' 5\" (1.651 m)   Wt 71.7 kg (158 lb)   SpO2 98%   BMI 26.29 kg/m²   Vitals were personally reviewed  Gen: no acute distress, pleasant and cooperative, sitting up in chair, able to climb to exam table w/o difficulty  HEENT: normocephalic/atraumatic, PERRLA, EOMI, no scleral icterus, no oral lesions, poor dentition, Mallampati III  Neck: supple, trachea midline, no JVD, no cervical and supraclavicular adenopathy  Chest: no lesions, with even rise and fall, no pectus excavatum or flail chest  CVS: regular rate rhythm, S1/S2, no murmurs/rubs/gallops  Lungs: good air entry B/L, CTABL, no wheezes/rales/rhonchi  Back: no kyphosis or scoliosis  Abdomen: soft, nontender, bowel sounds present, no hepatosplenomegaly  Ext: no pitting edema B/L, no peripheral cyanosis or clubbing  Neuro: grossly normal, AAOx3, normal strength and coordination grossly, no focal deficits  Skin: no rashes, erythema, lesions  Psych: normal memory, thought content, and processing    Labs: I have reviewed the patient's available labs  Labs from 11/13/2019 reviewed from care everywhere, CBC shows normal white count and hemoglobin, continues to show thrombocytosis with platelet count of 7121. BMP shows elevated glucose of 136, otherwise normal electrolytes and renal function        Imaging:  I have personally reviewed patient's imaging --none on file. PFTs: 8/14/2019: Spirometry is normal.  No BD response. TTE:  I have reviewed the patient's TTE results, done at [facility] on 6/12/2019  Result Impression   :   NORMAL GLOBAL LEFT VENTRICULAR SYSTOLIC FUNCTION. NORMAL LEFT VENTRICULAR EJECTION   FRACTION 65%. NO WALL MOTION ABNORMALITIES. NORMAL DIASTOLIC FUNCTION. NORMAL PULMONARY ARTERY PRESSURE OF   21MMHG. NO HEMODYNAMICALLY SIGNIFICANT VALVULAR PATHOLOGY. NO PREVIOUS REPORT FOR COMPARISON.        Clinical Indications: Heart Failure, Initial evaluation of known or suspected (systolic or diastolic)   ICD Codes: Technical Quality: Adequate     MEASUREMENTS:   2D ECHO    RV Diameter                       3.4 QU                5.3-2.0   LV Diastolic Diameter Base LX     4.9 NI                4.7-7.0   LV Systolic Diameter Base EH      3.2 PM                8.4-5.2   IVS Diastolic Thickness           0.8 cm                0.6-1.1 cm   LVPW Diastolic [id number]          4.5 QB                6.7-0.6 cm   LA Systolic Diameter LX           3.4 cm                2.1-3.7 cm   Aortic Root Diameter              3 FD                  9.0-2.6 cm   LA Systolic FLUPIEFY              42.2 mmHg   Ascending Aorta Diameter          3.1 cm   LA Area 4C View                   17.8 cm²   LA Area 2C View                   17.1 cm²   LA Length 4C                      4.3 cm   LA Length 2C                      5.2 cm   LA Volume                         59 cm³       DOPPLER    LVOT Diameter                     2.3 cm   LVOT Area                         4.2 cm²   Mitral E Point Velocity           94 cm/s   Mitral  A Point Velocity          108 cm/s   Mitral A Duration                 108 ms   Mitral E to A Ratio               0.87                  1.0 to 1.5   Mitral E to LV E' Septal Ratio    7.8   Mitral E to LV E' Lateral Ratio   7   MV Deceleration Time              88.5 ms               160-240 ms   Isovolumic Relaxation Time        78 ms                 70-90 ms   Pulm Vein Atrial Reversal Veloci  43.9 cm/s             <35 cm/s   Pulm Vein Atrial Duration         66 ms   E Prime Velocity                  13.5 cm/s   RA Pressure (Entered Value)       3 mmHg   TR Peak Velocity                  2.1 m/s   TR Peak [id number]                  21.5 mmHg   RV Systolic [id number]              18.2 mmHg       FINDINGS:     Left Ventricle: Normal left ventricular cavity size. Normal left ventricular wall thickness. Left Ventricle Normal global left ventricular systolic function. Normal left ventricular ejection fraction. Function: No wall motion abnormalities. Normal diastolic function. LVEF: 65 %       Left Atrium: The left atrium is normal in size. Left atrial volume index 33mLm2. Right Ventricle: The right ventricle is normal in size. Normal right ventricular function. Tricuspid Annular Plane Systolic Excursion (TAPSE) is 2.74cm. Tricuspid Annular Plane Systolic   Velocity (TAPSV) is 18.3cm/s. Right Atrium: The right atrium is normal in size. Normal inferior vena cava size and collapse. Mitral Valve: The mitral leaflets are normal.   Trace of mitral regurgitation. No stenosis. Aortic Valve: The aortic valve leaflets are normal. No regurgitation or stenosis. Tricuspid Valve: The tricuspid valve is structurally normal.   Trace of tricuspid regurgitation. Normal pulmonary artery pressure of   21mmHg. No stenosis. Pulmonic Valve: The pulmonic valve is structurally normal. No regurgitation or stenosis. Aorta:  The aorta is normal.   Pericardium: No pericardial effusion. Masses / Shunts: No masses, shunts or thrombi seen. Rosa Currie MD   (Electronically Signed)   Final Date: 12 June 2019 17:43   Cardiac catheterization report from 11/13/2019 reviewed  This result has an attachment that is not available. Result Narrative   Kumar Wagner MD     11/13/2019 10:16 AM  [facility]      Cardiac Catheterization Note    Patient: Lesli Emerson   #:  [id number]   [facility]   11/13/2019, 10:15 AM     PROCEDURES:       1. Coronary angiography      PROCEDURE DATE:  11/13/2019    PHYSICIAN:  Fazal Santos MD, MD, [facility]    REFERRING PHYSICIAN:  Aye Calles MD    PRE/POST OPERATIVE DIAGNOSIS:  Chest pain, +ST        ESTIMATED BLOOD LOSS:  09 ml    COMPLICATIONS:  None    SPECIMENS:  None    ASSISTANTS:  None    MEDICATIONS:   Versed, Fentanyl, Local 1% lidocaine, intraarterial Verapamil 2.5   mg, IV heparin. MODERATE SEDATION:  Total intraservice time:6 min  Start time:1005a  End time:1011a  Physician supervising sedation:Dr Merly Monique trained observer was present during the course of   the procedure    HEMOSTASIS:   TR band    PROCEDURAL NOTE:      Risks, benefits, and alternatives to the procedure were discussed   with the patient, who verbalized understanding and consented to   proceed. Time out was performed in the cath lab prior to the   procedure. The rightwrist was prepped and draped in the usual   sterile fashion. A 6French sheath was inserted to the right   radial artery using a micropuncture kit and modified Seldinger   technique. Intra-arterial verapamil administered to prevent   arterial spasm. IV heparin administered to reduce risk of   thrombosis. Preformed catheters were used for coronary   angiography.  All catheters were exchanged over a wire, advanced   under fluoroscopic guidance, carefully aspirated, inspected for   bubbles, and flushed prior to use.  The patient tolerated the   procedure well without complications. TR band applied at the end of procedure  FINDINGS:    CORONARY ANGIOGRAPHY:    1) Left main coronary artery:    normal    2) Left anterior descending coronary artery:  normal    3) Left circumflex coronary artery:nondominant, normal    4) Right coronary artery:  Normal and dominant        IMPRESSION:  1. Normal coronaries    RECOMMENDATIONS:    Hemostasis band removal in the recovery room. Medical treatment and risk factor modification. Thank you for allowing me to participate in his care. Elizabeth Hernandez MD, MD, [facility]  10:15 AM, 11/13/2019       Assessment and Plan:  47 y.o. male with:    Impression:  1. COPD with asthma overlap: Patient has emphysema consistent with COPD, patient also has elevated eosinophil count, 400 on 11/13/2019, 3600 on 6/11/2019 by manual count 900 on 2/5/2019, 1000 on 6/30/2018, and noting specific irritant triggers. No exacerbations in the interval  2. Palpitations: Etiology unclear. Patient had negative cardiac cath on 11/13/2019  3. Dyspnea on exertion: Etiology due to above  4. Nicotine dependence to cigarettes: Patient relapsed, smoking 1/5 pack/day. Prior 1PPD, started at age 15 -- 36 pack years    Plan:  -Work-up of palpitations per cardiology. -Reviewed patient's underlying asthma overlap with COPD. Counseled patient that he will need ICS in addition to controller bronchodilator therapy. Start Asmanex Twisthaler 220 mcg 1 puff once daily, sample given in clinic. Counseled patient to rinse mouth thoroughly after each use. -Restart Stiolto Respimat 2 puffs once daily, sample given in clinic  -Continue albuterol as needed  -Counseled patient to avoid potential triggers of asthma  -Depending on symptoms, may consider addition of LTRA therapy at next visit  -Full PFTs at next visit  -CT lung cancer screening ordered for June 2020. Shared decision-making performed.   Advised patient to get yearly until age 78.  -Advised patient remain active  -Immunizations reviewed, patient declines influenza vaccination  -I spent 12 minutes with patient regarding cessation of smoking cigarettes. I reviewed health risks of tobacco use including increased risk of MI, stroke, cancer, etc.  We reviewed various approaches to cessation including pills, patches, inhaler, gum, weaning self, \"cold turkey\", and smoking cessation classes. Pt was agreeable to cessation -- I prescribed nicotine patch taper as well as PRN nicotine replacement with lozenges q1h PRN      Follow-up and Dispositions    · Return in about 6 months (around 5/18/2020).        Orders Placed This Encounter    AMB POC PFT COMPLETE W/BRONCHODILATOR    AMB POC PFT COMPLETE W/O BRONCHODILATOR    GAS DILUT/WASHOUT LUNG VOL W/WO DISTRIB VENT&VOL    DIFFUSING CAPACITY    tiotropium-olodaterol (STIOLTO RESPIMAT) 2.5-2.5 mcg/actuation inhaler    tiotropium-olodaterol (STIOLTO RESPIMAT) 2.5-2.5 mcg/actuation inhaler    mometasone (ASMANEX TWISTHALER) 220 mcg/ actuation (120) aepb inhaler    mometasone (ASMANEX TWISTHALER) 220 mcg/ actuation (120) aepb inhaler    nicotine (NICODERM CQ) 7 mg/24 hr    nicotine buccal (POLACRILEX) 2 mg lozg lozenge    nicotine (NICODERM CQ) 14 mg/24 hr patch       King Willis MD/MPH     Pulmonary, Critical Care Medicine  [facility]

## 2019-11-18 NOTE — PROGRESS NOTES
Meme Gasca presents today for   Chief Complaint   Patient presents with    COPD    Other     Dyspnea on exertion. Is someone accompanying this pt? No    Is the patient using any DME equipment during OV? No    -DME Company NA    Depression Screening:  3 most recent PHQ Screens 11/18/2019   Little interest or pleasure in doing things Not at all   Feeling down, depressed, irritable, or hopeless Not at all   Total Score PHQ 2 0       Learning Assessment:  Learning Assessment 7/31/2019   PRIMARY LEARNER Patient   BARRIERS PRIMARY LEARNER NONE   CO-LEARNER CAREGIVER No   PRIMARY LANGUAGE ENGLISH   LEARNER PREFERENCE PRIMARY LISTENING   ANSWERED BY patient   RELATIONSHIP SELF       Abuse Screening:  No flowsheet data found. Fall Risk  No flowsheet data found. Coordination of Care:  1. Have you been to the ER, urgent care clinic since your last visit? Hospitalized since your last visit? No    2. Have you seen or consulted any other health care providers outside of the [address] [place] [address] since your last visit? Include any pap smears or colon screening. Dr. George Talley PCP.

## 2019-11-18 NOTE — LETTER
11/18/19 Patient: Stephanie Mckeon YOB: 1965 Date of Visit: 11/18/2019 Richard Pollock MD 
[address] [address] [place] [address] VIA Facsimile: [phone] Dear Richard Pollock MD, Thank you for referring Mr. Stephanie Mckeon to [address] [place] [address] for evaluation. My notes for this consultation are attached. If you have questions, please do not hesitate to call me. I look forward to following your patient along with you. Sincerely, Tk Lerner MD

## 2021-03-31 ENCOUNTER — DOCUMENTATION ONLY (OUTPATIENT)
Dept: PULMONOLOGY | Age: 56
End: 2021-03-31

## 2021-03-31 DIAGNOSIS — R06.02 SOB (SHORTNESS OF BREATH): ICD-10-CM

## 2021-03-31 DIAGNOSIS — R06.09 DYSPNEA ON EXERTION: ICD-10-CM

## 2021-03-31 DIAGNOSIS — J44.9 ASTHMA-COPD OVERLAP SYNDROME (HCC): Primary | ICD-10-CM

## 2021-03-31 DIAGNOSIS — F17.210 CIGARETTE NICOTINE DEPENDENCE WITHOUT COMPLICATION: ICD-10-CM

## 2021-03-31 DIAGNOSIS — J44.9 COPD, GROUP B, BY GOLD 2017 CLASSIFICATION (HCC): ICD-10-CM

## 2021-03-31 DIAGNOSIS — Z87.891 PERSONAL HISTORY OF TOBACCO USE, PRESENTING HAZARDS TO HEALTH: ICD-10-CM

## 2021-03-31 NOTE — PROGRESS NOTES
Order placed for COVID-19 test, per Verbal Order from Dr. Fred Barger on 3/31/2021. Last office visit: 11/18/2019  Follow up Visit: 4/15/2021 (PFT) & 4/16/2021 (Medical Clearance)    Provider is aware of last office visit and follow up. No further action requested from provider.

## 2021-04-06 PROBLEM — R79.89 ELEVATED LFTS: Status: ACTIVE | Noted: 2021-01-22

## 2021-04-06 PROBLEM — D75.839 THROMBOCYTOSIS: Status: ACTIVE | Noted: 2019-02-05

## 2021-04-06 PROBLEM — Z87.442 HX OF RENAL CALCULI: Status: ACTIVE | Noted: 2018-07-20

## 2021-04-06 PROBLEM — C61 PROSTATE CANCER (HCC): Status: ACTIVE | Noted: 2020-03-23

## 2021-04-06 PROBLEM — Z20.822 SUSPECTED COVID-19 VIRUS INFECTION: Status: ACTIVE | Noted: 2021-01-22

## 2021-04-06 PROBLEM — Z00.00 HEALTH CARE MAINTENANCE: Status: ACTIVE | Noted: 2018-07-20

## 2021-04-06 PROBLEM — R31.9 HEMATURIA, UNDIAGNOSED CAUSE: Status: ACTIVE | Noted: 2018-07-20

## 2021-04-06 PROBLEM — J44.1 COPD WITH ACUTE EXACERBATION (HCC): Status: ACTIVE | Noted: 2019-06-11

## 2021-04-06 PROBLEM — E11.65 TYPE 2 DIABETES MELLITUS WITH HYPERGLYCEMIA, WITHOUT LONG-TERM CURRENT USE OF INSULIN (HCC): Status: ACTIVE | Noted: 2020-02-11

## 2021-04-26 ENCOUNTER — DOCUMENTATION ONLY (OUTPATIENT)
Dept: PULMONOLOGY | Age: 56
End: 2021-04-26

## 2021-04-26 NOTE — PROGRESS NOTES
Lf  for pt to see if pt was still coming 4/29 and 4/30 for breathing test and mc; d/t seeing a pulm dr in [facility] was going to talk w/Dr Lucero's office and let us know.; if pt still coming-has pt had covid testing for breathing test?

## 2022-03-18 PROBLEM — J44.1 COPD WITH ACUTE EXACERBATION (HCC): Status: ACTIVE | Noted: 2019-06-11

## 2022-03-18 PROBLEM — Z00.00 HEALTH CARE MAINTENANCE: Status: ACTIVE | Noted: 2018-07-20

## 2022-03-18 PROBLEM — Z20.822 SUSPECTED COVID-19 VIRUS INFECTION: Status: ACTIVE | Noted: 2021-01-22

## 2022-03-19 PROBLEM — R31.9 HEMATURIA, UNDIAGNOSED CAUSE: Status: ACTIVE | Noted: 2018-07-20

## 2022-03-19 PROBLEM — R79.89 ELEVATED LFTS: Status: ACTIVE | Noted: 2021-01-22

## 2022-03-19 PROBLEM — E11.65 TYPE 2 DIABETES MELLITUS WITH HYPERGLYCEMIA, WITHOUT LONG-TERM CURRENT USE OF INSULIN (HCC): Status: ACTIVE | Noted: 2020-02-11

## 2022-03-20 PROBLEM — Z87.442 HX OF RENAL CALCULI: Status: ACTIVE | Noted: 2018-07-20

## 2022-03-20 PROBLEM — C61 PROSTATE CANCER (HCC): Status: ACTIVE | Noted: 2020-03-23

## 2022-03-20 PROBLEM — D75.839 THROMBOCYTOSIS: Status: ACTIVE | Noted: 2019-02-05

## 2024-05-31 ENCOUNTER — APPOINTMENT (OUTPATIENT)
Facility: HOSPITAL | Age: 59
DRG: 433 | End: 2024-05-31
Payer: MEDICARE

## 2024-05-31 ENCOUNTER — HOSPITAL ENCOUNTER (INPATIENT)
Facility: HOSPITAL | Age: 59
LOS: 5 days | Discharge: HOME | DRG: 433 | End: 2024-06-05
Attending: EMERGENCY MEDICINE | Admitting: INTERNAL MEDICINE
Payer: MEDICARE

## 2024-05-31 ENCOUNTER — APPOINTMENT (OUTPATIENT)
Facility: HOSPITAL | Age: 59
DRG: 433 | End: 2024-05-31
Attending: INTERNAL MEDICINE
Payer: MEDICARE

## 2024-05-31 DIAGNOSIS — K72.00 ACUTE LIVER FAILURE WITHOUT HEPATIC COMA: ICD-10-CM

## 2024-05-31 DIAGNOSIS — K70.30 ALCOHOLIC CIRRHOSIS, UNSPECIFIED WHETHER ASCITES PRESENT (HCC): ICD-10-CM

## 2024-05-31 DIAGNOSIS — D64.9 ANEMIA, UNSPECIFIED TYPE: Primary | ICD-10-CM

## 2024-05-31 DIAGNOSIS — K70.31 ASCITES DUE TO ALCOHOLIC CIRRHOSIS (HCC): ICD-10-CM

## 2024-05-31 DIAGNOSIS — R60.0 BILATERAL LOWER EXTREMITY EDEMA: ICD-10-CM

## 2024-05-31 PROBLEM — K72.90 LIVER FAILURE WITHOUT HEPATIC COMA (HCC): Status: ACTIVE | Noted: 2024-05-31

## 2024-05-31 LAB
ALBUMIN FLD-MCNC: <0.6 G/DL
ALBUMIN SERPL-MCNC: 1.8 G/DL (ref 3.4–5)
ALBUMIN/GLOB SERPL: 0.3 (ref 0.8–1.7)
ALP SERPL-CCNC: 128 U/L (ref 45–117)
ALT SERPL-CCNC: 29 U/L (ref 16–61)
AMMONIA PLAS-SCNC: 39 UMOL/L (ref 11–32)
ANION GAP SERPL CALC-SCNC: 12 MMOL/L (ref 3–18)
APAP SERPL-MCNC: <2 UG/ML (ref 10–30)
APPEARANCE FLD: ABNORMAL
AST SERPL-CCNC: 104 U/L (ref 10–38)
BASOPHILS # BLD: 0.1 K/UL (ref 0–0.1)
BASOPHILS NFR BLD: 1 % (ref 0–2)
BILIRUB DIRECT SERPL-MCNC: 1.4 MG/DL (ref 0–0.2)
BILIRUB SERPL-MCNC: 2 MG/DL (ref 0.2–1)
BUN SERPL-MCNC: 5 MG/DL (ref 7–18)
BUN/CREAT SERPL: 4 (ref 12–20)
CALCIUM SERPL-MCNC: 8.4 MG/DL (ref 8.5–10.1)
CHLORIDE SERPL-SCNC: 102 MMOL/L (ref 100–111)
CO2 SERPL-SCNC: 22 MMOL/L (ref 21–32)
COLOR FLD: YELLOW
CREAT SERPL-MCNC: 1.15 MG/DL (ref 0.6–1.3)
DIFFERENTIAL METHOD BLD: ABNORMAL
ECHO BSA: 1.77 M2
EOSINOPHIL # BLD: 0.4 K/UL (ref 0–0.4)
EOSINOPHIL NFR BLD: 4 % (ref 0–5)
EOSINOPHIL NFR FLD MANUAL: 0 %
ERYTHROCYTE [DISTWIDTH] IN BLOOD BY AUTOMATED COUNT: 14.9 % (ref 11.6–14.5)
GLOBULIN SER CALC-MCNC: 6.5 G/DL (ref 2–4)
GLUCOSE SERPL-MCNC: 116 MG/DL (ref 74–99)
HCT VFR BLD AUTO: 21.4 % (ref 36–48)
HEMOCCULT STL QL: NEGATIVE
HGB BLD-MCNC: 7.5 G/DL (ref 13–16)
IMM GRANULOCYTES # BLD AUTO: 0.1 K/UL (ref 0–0.04)
IMM GRANULOCYTES NFR BLD AUTO: 1 % (ref 0–0.5)
INR PPP: 2.1 (ref 0.9–1.1)
LIPASE SERPL-CCNC: 16 U/L (ref 13–75)
LYMPHOCYTES # BLD: 2.9 K/UL (ref 0.9–3.6)
LYMPHOCYTES NFR BLD: 27 % (ref 21–52)
LYMPHOCYTES NFR FLD: 81 %
MCH RBC QN AUTO: 30.1 PG (ref 24–34)
MCHC RBC AUTO-ENTMCNC: 35 G/DL (ref 31–37)
MCV RBC AUTO: 85.9 FL (ref 78–100)
MONOCYTES # BLD: 1.3 K/UL (ref 0.05–1.2)
MONOCYTES NFR BLD: 12 % (ref 3–10)
MONOCYTES NFR FLD: 17 %
NEUTROPHILS NFR FLD: 2 % (ref 0–25)
NEUTS BAND # FLD: 0 %
NEUTS SEG # BLD: 6.2 K/UL (ref 1.8–8)
NEUTS SEG NFR BLD: 56 % (ref 40–73)
NRBC # BLD: 0 K/UL (ref 0–0.01)
NRBC BLD-RTO: 0 PER 100 WBC
NT PRO BNP: 95 PG/ML (ref 0–900)
NUC CELL # FLD: 109 /CU MM
PLATELET # BLD AUTO: 559 K/UL (ref 135–420)
PMV BLD AUTO: 9.5 FL (ref 9.2–11.8)
POTASSIUM SERPL-SCNC: 3.4 MMOL/L (ref 3.5–5.5)
PROT FLD-MCNC: <2 G/DL
PROT SERPL-MCNC: 8.3 G/DL (ref 6.4–8.2)
PROTHROMBIN TIME: 23.7 SEC (ref 11.9–14.7)
RBC # BLD AUTO: 2.49 M/UL (ref 4.35–5.65)
RBC # FLD: <2000 /CU MM
SODIUM SERPL-SCNC: 136 MMOL/L (ref 136–145)
SPECIMEN SOURCE FLD: ABNORMAL
SPECIMEN SOURCE FLD: NORMAL
SPECIMEN SOURCE FLD: NORMAL
TROPONIN I SERPL HS-MCNC: 7 NG/L (ref 0–78)
TSH SERPL DL<=0.05 MIU/L-ACNC: 3.01 UIU/ML (ref 0.36–3.74)
WBC # BLD AUTO: 11 K/UL (ref 4.6–13.2)

## 2024-05-31 PROCEDURE — 6370000000 HC RX 637 (ALT 250 FOR IP): Performed by: INTERNAL MEDICINE

## 2024-05-31 PROCEDURE — 94761 N-INVAS EAR/PLS OXIMETRY MLT: CPT

## 2024-05-31 PROCEDURE — 80143 DRUG ASSAY ACETAMINOPHEN: CPT

## 2024-05-31 PROCEDURE — 99285 EMERGENCY DEPT VISIT HI MDM: CPT

## 2024-05-31 PROCEDURE — 2709999900 US GUIDED PARACENTESIS

## 2024-05-31 PROCEDURE — 96374 THER/PROPH/DIAG INJ IV PUSH: CPT

## 2024-05-31 PROCEDURE — 85610 PROTHROMBIN TIME: CPT

## 2024-05-31 PROCEDURE — 6360000004 HC RX CONTRAST MEDICATION: Performed by: EMERGENCY MEDICINE

## 2024-05-31 PROCEDURE — 80076 HEPATIC FUNCTION PANEL: CPT

## 2024-05-31 PROCEDURE — 93970 EXTREMITY STUDY: CPT

## 2024-05-31 PROCEDURE — 6370000000 HC RX 637 (ALT 250 FOR IP): Performed by: PHYSICIAN ASSISTANT

## 2024-05-31 PROCEDURE — 89051 BODY FLUID CELL COUNT: CPT

## 2024-05-31 PROCEDURE — 93970 EXTREMITY STUDY: CPT | Performed by: INTERNAL MEDICINE

## 2024-05-31 PROCEDURE — 71045 X-RAY EXAM CHEST 1 VIEW: CPT

## 2024-05-31 PROCEDURE — 74177 CT ABD & PELVIS W/CONTRAST: CPT

## 2024-05-31 PROCEDURE — 82140 ASSAY OF AMMONIA: CPT

## 2024-05-31 PROCEDURE — 82272 OCCULT BLD FECES 1-3 TESTS: CPT

## 2024-05-31 PROCEDURE — 2580000003 HC RX 258: Performed by: INTERNAL MEDICINE

## 2024-05-31 PROCEDURE — 93005 ELECTROCARDIOGRAM TRACING: CPT | Performed by: EMERGENCY MEDICINE

## 2024-05-31 PROCEDURE — 80048 BASIC METABOLIC PNL TOTAL CA: CPT

## 2024-05-31 PROCEDURE — 1100000000 HC RM PRIVATE

## 2024-05-31 PROCEDURE — 85025 COMPLETE CBC W/AUTO DIFF WBC: CPT

## 2024-05-31 PROCEDURE — 0W9G3ZZ DRAINAGE OF PERITONEAL CAVITY, PERCUTANEOUS APPROACH: ICD-10-PCS | Performed by: STUDENT IN AN ORGANIZED HEALTH CARE EDUCATION/TRAINING PROGRAM

## 2024-05-31 PROCEDURE — 83690 ASSAY OF LIPASE: CPT

## 2024-05-31 PROCEDURE — 6360000002 HC RX W HCPCS: Performed by: INTERNAL MEDICINE

## 2024-05-31 PROCEDURE — 99223 1ST HOSP IP/OBS HIGH 75: CPT | Performed by: INTERNAL MEDICINE

## 2024-05-31 PROCEDURE — 76705 ECHO EXAM OF ABDOMEN: CPT

## 2024-05-31 PROCEDURE — 6360000002 HC RX W HCPCS: Performed by: EMERGENCY MEDICINE

## 2024-05-31 PROCEDURE — 82042 OTHER SOURCE ALBUMIN QUAN EA: CPT

## 2024-05-31 PROCEDURE — 84157 ASSAY OF PROTEIN OTHER: CPT

## 2024-05-31 PROCEDURE — 84484 ASSAY OF TROPONIN QUANT: CPT

## 2024-05-31 PROCEDURE — 84443 ASSAY THYROID STIM HORMONE: CPT

## 2024-05-31 PROCEDURE — 83880 ASSAY OF NATRIURETIC PEPTIDE: CPT

## 2024-05-31 RX ORDER — ACETAMINOPHEN 325 MG/1
650 TABLET ORAL EVERY 6 HOURS PRN
Status: DISCONTINUED | OUTPATIENT
Start: 2024-05-31 | End: 2024-06-05 | Stop reason: HOSPADM

## 2024-05-31 RX ORDER — SODIUM CHLORIDE 0.9 % (FLUSH) 0.9 %
5-40 SYRINGE (ML) INJECTION EVERY 12 HOURS SCHEDULED
Status: DISCONTINUED | OUTPATIENT
Start: 2024-05-31 | End: 2024-06-05 | Stop reason: HOSPADM

## 2024-05-31 RX ORDER — MORPHINE SULFATE 4 MG/ML
4 INJECTION, SOLUTION INTRAMUSCULAR; INTRAVENOUS
Status: COMPLETED | OUTPATIENT
Start: 2024-05-31 | End: 2024-05-31

## 2024-05-31 RX ORDER — SODIUM CHLORIDE 0.9 % (FLUSH) 0.9 %
5-40 SYRINGE (ML) INJECTION PRN
Status: DISCONTINUED | OUTPATIENT
Start: 2024-05-31 | End: 2024-06-05 | Stop reason: HOSPADM

## 2024-05-31 RX ORDER — MULTIVIT-MIN/FERROUS GLUCONATE 9 MG/15 ML
15 LIQUID (ML) ORAL DAILY
Status: DISCONTINUED | OUTPATIENT
Start: 2024-05-31 | End: 2024-06-05 | Stop reason: HOSPADM

## 2024-05-31 RX ORDER — LORAZEPAM 1 MG/1
2 TABLET ORAL
Status: DISCONTINUED | OUTPATIENT
Start: 2024-05-31 | End: 2024-06-05 | Stop reason: HOSPADM

## 2024-05-31 RX ORDER — ACETAMINOPHEN 650 MG/1
650 SUPPOSITORY RECTAL EVERY 6 HOURS PRN
Status: DISCONTINUED | OUTPATIENT
Start: 2024-05-31 | End: 2024-06-05 | Stop reason: HOSPADM

## 2024-05-31 RX ORDER — MORPHINE SULFATE 2 MG/ML
2 INJECTION, SOLUTION INTRAMUSCULAR; INTRAVENOUS EVERY 4 HOURS PRN
Status: DISCONTINUED | OUTPATIENT
Start: 2024-05-31 | End: 2024-06-05 | Stop reason: HOSPADM

## 2024-05-31 RX ORDER — LACTULOSE 10 G/15ML
20 SOLUTION ORAL 2 TIMES DAILY
Status: DISCONTINUED | OUTPATIENT
Start: 2024-05-31 | End: 2024-06-01

## 2024-05-31 RX ORDER — GAUZE BANDAGE 2" X 2"
100 BANDAGE TOPICAL DAILY
Status: DISCONTINUED | OUTPATIENT
Start: 2024-05-31 | End: 2024-06-05 | Stop reason: HOSPADM

## 2024-05-31 RX ORDER — FOLIC ACID 1 MG/1
1 TABLET ORAL DAILY
Status: DISCONTINUED | OUTPATIENT
Start: 2024-05-31 | End: 2024-06-05 | Stop reason: HOSPADM

## 2024-05-31 RX ORDER — POTASSIUM CHLORIDE 7.45 MG/ML
10 INJECTION INTRAVENOUS PRN
Status: DISCONTINUED | OUTPATIENT
Start: 2024-05-31 | End: 2024-06-05 | Stop reason: HOSPADM

## 2024-05-31 RX ORDER — LORAZEPAM 1 MG/1
4 TABLET ORAL
Status: DISCONTINUED | OUTPATIENT
Start: 2024-05-31 | End: 2024-06-05 | Stop reason: HOSPADM

## 2024-05-31 RX ORDER — ONDANSETRON 2 MG/ML
4 INJECTION INTRAMUSCULAR; INTRAVENOUS EVERY 6 HOURS PRN
Status: DISCONTINUED | OUTPATIENT
Start: 2024-05-31 | End: 2024-06-05 | Stop reason: HOSPADM

## 2024-05-31 RX ORDER — ENOXAPARIN SODIUM 100 MG/ML
40 INJECTION SUBCUTANEOUS DAILY
Status: DISCONTINUED | OUTPATIENT
Start: 2024-05-31 | End: 2024-06-05 | Stop reason: HOSPADM

## 2024-05-31 RX ORDER — LANOLIN ALCOHOL/MO/W.PET/CERES
100 CREAM (GRAM) TOPICAL DAILY
Status: DISCONTINUED | OUTPATIENT
Start: 2024-05-31 | End: 2024-05-31 | Stop reason: CLARIF

## 2024-05-31 RX ORDER — LORAZEPAM 2 MG/ML
3 INJECTION INTRAMUSCULAR
Status: DISCONTINUED | OUTPATIENT
Start: 2024-05-31 | End: 2024-06-05 | Stop reason: HOSPADM

## 2024-05-31 RX ORDER — MAGNESIUM SULFATE 1 G/100ML
1000 INJECTION INTRAVENOUS ONCE
Status: COMPLETED | OUTPATIENT
Start: 2024-05-31 | End: 2024-05-31

## 2024-05-31 RX ORDER — LORAZEPAM 1 MG/1
1 TABLET ORAL
Status: DISCONTINUED | OUTPATIENT
Start: 2024-05-31 | End: 2024-06-05 | Stop reason: HOSPADM

## 2024-05-31 RX ORDER — SODIUM CHLORIDE 9 MG/ML
INJECTION, SOLUTION INTRAVENOUS PRN
Status: DISCONTINUED | OUTPATIENT
Start: 2024-05-31 | End: 2024-06-05 | Stop reason: HOSPADM

## 2024-05-31 RX ORDER — LORAZEPAM 2 MG/ML
1 INJECTION INTRAMUSCULAR
Status: DISCONTINUED | OUTPATIENT
Start: 2024-05-31 | End: 2024-06-05 | Stop reason: HOSPADM

## 2024-05-31 RX ORDER — LORAZEPAM 2 MG/ML
2 INJECTION INTRAMUSCULAR
Status: DISCONTINUED | OUTPATIENT
Start: 2024-05-31 | End: 2024-06-05 | Stop reason: HOSPADM

## 2024-05-31 RX ORDER — POTASSIUM CHLORIDE 20 MEQ/1
40 TABLET, EXTENDED RELEASE ORAL PRN
Status: DISCONTINUED | OUTPATIENT
Start: 2024-05-31 | End: 2024-06-05 | Stop reason: HOSPADM

## 2024-05-31 RX ORDER — POLYETHYLENE GLYCOL 3350 17 G/17G
17 POWDER, FOR SOLUTION ORAL DAILY PRN
Status: DISCONTINUED | OUTPATIENT
Start: 2024-05-31 | End: 2024-06-05 | Stop reason: HOSPADM

## 2024-05-31 RX ORDER — MAGNESIUM SULFATE IN WATER 40 MG/ML
2000 INJECTION, SOLUTION INTRAVENOUS PRN
Status: DISCONTINUED | OUTPATIENT
Start: 2024-05-31 | End: 2024-06-05 | Stop reason: HOSPADM

## 2024-05-31 RX ORDER — LORAZEPAM 2 MG/ML
4 INJECTION INTRAMUSCULAR
Status: DISCONTINUED | OUTPATIENT
Start: 2024-05-31 | End: 2024-06-05 | Stop reason: HOSPADM

## 2024-05-31 RX ORDER — ONDANSETRON 4 MG/1
4 TABLET, ORALLY DISINTEGRATING ORAL EVERY 8 HOURS PRN
Status: DISCONTINUED | OUTPATIENT
Start: 2024-05-31 | End: 2024-06-05 | Stop reason: HOSPADM

## 2024-05-31 RX ORDER — LORAZEPAM 1 MG/1
3 TABLET ORAL
Status: DISCONTINUED | OUTPATIENT
Start: 2024-05-31 | End: 2024-06-05 | Stop reason: HOSPADM

## 2024-05-31 RX ORDER — AMLODIPINE BESYLATE 5 MG/1
5 TABLET ORAL DAILY
Status: DISCONTINUED | OUTPATIENT
Start: 2024-05-31 | End: 2024-06-05 | Stop reason: HOSPADM

## 2024-05-31 RX ADMIN — SODIUM CHLORIDE, PRESERVATIVE FREE 10 ML: 5 INJECTION INTRAVENOUS at 20:43

## 2024-05-31 RX ADMIN — MORPHINE SULFATE 2 MG: 2 INJECTION, SOLUTION INTRAMUSCULAR; INTRAVENOUS at 20:50

## 2024-05-31 RX ADMIN — FOLIC ACID 1 MG: 1 TABLET ORAL at 15:47

## 2024-05-31 RX ADMIN — AMLODIPINE BESYLATE 5 MG: 5 TABLET ORAL at 15:46

## 2024-05-31 RX ADMIN — ENOXAPARIN SODIUM 40 MG: 100 INJECTION SUBCUTANEOUS at 16:19

## 2024-05-31 RX ADMIN — LACTULOSE 20 G: 20 SOLUTION ORAL at 20:43

## 2024-05-31 RX ADMIN — POTASSIUM BICARBONATE 40 MEQ: 782 TABLET, EFFERVESCENT ORAL at 15:45

## 2024-05-31 RX ADMIN — Medication 100 MG: at 15:47

## 2024-05-31 RX ADMIN — MAGNESIUM SULFATE HEPTAHYDRATE 1000 MG: 10 INJECTION, SOLUTION INTRAVENOUS at 15:47

## 2024-05-31 RX ADMIN — IOPAMIDOL 80 ML: 612 INJECTION, SOLUTION INTRAVENOUS at 06:01

## 2024-05-31 RX ADMIN — MORPHINE SULFATE 4 MG: 4 INJECTION, SOLUTION INTRAMUSCULAR; INTRAVENOUS at 04:50

## 2024-05-31 RX ADMIN — PHYTONADIONE 5 MG: 10 INJECTION, EMULSION INTRAMUSCULAR; INTRAVENOUS; SUBCUTANEOUS at 16:19

## 2024-05-31 RX ADMIN — Medication 15 ML: at 15:49

## 2024-05-31 ASSESSMENT — PAIN DESCRIPTION - DESCRIPTORS
DESCRIPTORS: SHARP
DESCRIPTORS: ACHING
DESCRIPTORS: SHARP
DESCRIPTORS: STABBING

## 2024-05-31 ASSESSMENT — PAIN DESCRIPTION - ONSET
ONSET: PROGRESSIVE
ONSET: ON-GOING
ONSET: PROGRESSIVE

## 2024-05-31 ASSESSMENT — PAIN DESCRIPTION - PAIN TYPE
TYPE: ACUTE PAIN

## 2024-05-31 ASSESSMENT — PAIN DESCRIPTION - LOCATION
LOCATION: ABDOMEN
LOCATION: LEG

## 2024-05-31 ASSESSMENT — PAIN SCALES - GENERAL
PAINLEVEL_OUTOF10: 3
PAINLEVEL_OUTOF10: 8
PAINLEVEL_OUTOF10: 0
PAINLEVEL_OUTOF10: 7
PAINLEVEL_OUTOF10: 8
PAINLEVEL_OUTOF10: 5
PAINLEVEL_OUTOF10: 3
PAINLEVEL_OUTOF10: 0

## 2024-05-31 ASSESSMENT — PAIN DESCRIPTION - FREQUENCY
FREQUENCY: CONTINUOUS

## 2024-05-31 ASSESSMENT — PAIN DESCRIPTION - ORIENTATION
ORIENTATION: RIGHT
ORIENTATION: LEFT;RIGHT

## 2024-05-31 ASSESSMENT — PAIN - FUNCTIONAL ASSESSMENT
PAIN_FUNCTIONAL_ASSESSMENT: ACTIVITIES ARE NOT PREVENTED
PAIN_FUNCTIONAL_ASSESSMENT: 0-10
PAIN_FUNCTIONAL_ASSESSMENT: ACTIVITIES ARE NOT PREVENTED

## 2024-05-31 NOTE — ED TRIAGE NOTES
Patient brought in by EMS via stretcher to Room 9. Patient complains of chest pain x 1 hour.  Patient has mid chest pain that is reproducible when pressed. Bilateral edema lower legs x 2 days. Abdominal distention x 2 days. History of DM and HTN. Edema b legs x 2 days. Diarrhea x 2 days. Vital signs within normal range: 126/92, 97HR. IV 20g Right AC.    Vs 126/92, 97, IV 20g r AC

## 2024-05-31 NOTE — PROGRESS NOTES
4 Eyes Skin Assessment     NAME:  Luc Patel  YOB: 1965  MEDICAL RECORD NUMBER:  [id number]    The patient is being assessed for  Admission    I agree that at least one RN has performed a thorough Head to Toe Skin Assessment on the patient. ALL assessment sites listed below have been assessed.      Areas assessed by both nurses:    Sacrum. Buttock, Coccyx, Ischium        Does the Patient have a Wound? No noted wound(s)       Clif Prevention initiated by RN: No  Wound Care Orders initiated by RN: No    Pressure Injury (Stage 3,4, Unstageable, DTI, NWPT, and Complex wounds) if present, place Wound referral order by RN under : No    New Ostomies, if present place, Ostomy referral order under : No     Nurse 1 eSignature: Electronically signed by Audelia Valiente RN on 5/31/24 at 3:43 PM EDT    **SHARE this note so that the co-signing nurse can place an eSignature**    Nurse 2 eSignature: Electronically signed by Tabby Khan RN on 5/31/24 at 3:45 PM EDT

## 2024-05-31 NOTE — CONSULTS
GASTROENTEROLOGY CONSULT        Impression:   1. Decompensated alcoholic cirrhosis of liver with ascites absence hepatic encephalopathy   MELD-Na: 20 at 5/31/2024   Negative hepatoma on CT  2. Portal hypertension  3. RUQ pain  4. Alcohol use disorder  5. Elevated LFTs  6. Anemia, normocytic without overt GI bleed; heme negative  7. Mild coagulopathy  8. Hyperammonemia  9. Hypoalbuminemia  10. Tachycardia      Plan:     1. Diagnostic and therapeutic paracentesis with fluid analysis as ordered. AFP ordered.  2. Will tentatively plan for EGD next week prior to hospital discharge.  3. Colonoscopy at outpatient.  4. Start Xifaxan 400 mg TID. Lactulose ordered. Titrate to achieve no more than 2-3 soft stools a day. Hold for 3 days if diarrhea.  5. Vitamin K noted. Mg repletion noted  6. Nonselective betablocker prefer such as nadolol or carvedilol given to keep heart rate above 55 bpm patient has portal hypertension.  7. Low sodium diet. Nutritional support and multivitamins. Dietitian evaluation for nutrition screening - consult placed  8. Iron profile, CBC, CMP, INR in the morning.  9. Naltrexone or baclofen as appropriate in additional to CIWA protocol to help achieve/maintain alcohol cessation  10. No NSAIDs. Acetaminophen up to 2000 mg daily is safe.  11. Medical management otherwise per primary team.        Chief Complaint: swelling in legs, RUQ pain      HPI:  Luc Patel is a 59 y.o. male who I am being asked to see in consultation for an opinion regarding decompensated alcohol cirrhosis of liver. Patient arrived to [facility] ED for evaluation of worsening leg swelling and pain in RUQ that he describes a rib pain. No nausea, vomiting. His ED work-up noted NSR on ECG, elevated LFTs and anemia. He has heme negative stool. CT scan with finding of cirrhosis and stigmata of portal venous hypertension including ascites and varices. He was admitted for inpatient management. Patient mentions that for most his life he has

## 2024-05-31 NOTE — ED PROVIDER NOTES
EMERGENCY DEPARTMENT HISTORY AND PHYSICAL EXAM      Date: 5/31/2024  Patient Name: Luc Patel      History of Presenting Illness     No chief complaint on file.      Location/Duration/Severity/Modifying factors   No chief complaint on file.      HPI:  Luc Patel is a 59 y.o. male with PMH significant for daily alcohol abuse, 6 beers per day, type 2 diabetes mellitus, staghorn calculus of the right kidney, hepatic steatosis hypertension, COPD presents w via EMS ith 1 week of recently worsening swelling to the legs, abdomen, right-sided upper abdominal  pain.  Tonight noticed central chest pain while ambulating approximately starting 1 hour ago.  Pain is now improving.  Right upper quadrant pain still present.  Pt  denies nausea or vomiting, prior history of liver disease or heart issues.    PCP: Berenice Steinberg MD    Current Facility-Administered Medications   Medication Dose Route Frequency Provider Last Rate Last Admin    iopamidol (ISOVUE-300) 61 % injection 80 mL  80 mL IntraVENous ONCE PRN Michel Burnett DO         Current Outpatient Medications   Medication Sig Dispense Refill    ALLOPURINOL PO Take by mouth      albuterol sulfate HFA (PROVENTIL;VENTOLIN;PROAIR) 108 (90 Base) MCG/ACT inhaler Inhale 2 puffs into the lungs every 4 hours as needed      amLODIPine (NORVASC) 5 MG tablet take 1 tablet by mouth once daily      diazePAM (VALIUM) 10 MG tablet Take 1 tablet by mouth 1 hour prior to imaging (Patient not taking: Reported on 5/31/2024)      ergocalciferol (ERGOCALCIFEROL) 1.25 MG (66510 UT) capsule Take 50,000 Units by mouth every 7 days (Patient not taking: Reported on 5/31/2024)      mometasone (ASMANEX) 220 MCG/ACT AEPB inhaler Inhale 220 mcg into the lungs daily (Patient not taking: Reported on 5/31/2024)      nicotine polacrilex (COMMIT) 2 MG lozenge Take 2 mg by mouth (Patient not taking: Reported on 5/31/2024)      sertraline (ZOLOFT) 25 MG tablet Take 25 mg by mouth daily (Patient

## 2024-05-31 NOTE — H&P
History and Physical    Patient: Luc Patel MRN: [id number]  SSN: [id number]    YOB: 1965    Age: 59 y.o.  Sex: male    Berenice Steinberg MD    C/C : Abdominal pain leg edema    Subjective:      Luc Patel is a 59 y.o. male with PMH of chronic alcoholism smoking now being admitted with increasing abdominal girth and bilateral lower extremity edema.    59-year-old male in ED at [facility], denies any other past medical history except mentioned above, had a fall ground-level, now having pain right upper quadrant rib cage area, no local injury bleeding.  Patient chronic alcoholic, and a smoker.  Does not give any exact time when it started he noticed both his legs are swelling his abdomen is distended and tense this is his first time noticing it.  Denies any fever chills no abdominal pain.  Having diarrhea for last 3 to 4 days but no blood or mucus.     Patient denies any chest pain mild discomfort while breathing due to distention of abdomen, but no respiratory distress noted.            Past Medical History:   Diagnosis Date    Chronic lung disease     Chronic obstructive pulmonary disease (HCC)     Diabetes (HCC)     Diabetes mellitus (HCC)     Elevated PSA     Fast heart beat     periodically    Hematuria     Hypertension     Prostate cancer (HCC)     Prostate disease     Staghorn kidney stones      Past Surgical History:   Procedure Laterality Date    CT BONE MARROW BIOPSY  2/15/2019    CT BONE MARROW BIOPSY 2/15/2019    IR BIOPSY PERC SUPERF BONE      OTHER SURGICAL HISTORY      ?cyst  lower back       Family History   Problem Relation Age of Onset    Diabetes Sister     Heart Disease Mother         CHF    Diabetes Mother     Diabetes Brother      Social History     Tobacco Use    Smoking status: Every Day     Current packs/day: 0.00     Types: Cigarettes     Last attempt to quit: 2019     Years since quittin.4    Smokeless tobacco: Never   Substance Use Topics    Alcohol  use: Not Currently     Alcohol/week: 12.0 standard drinks of alcohol     Types: 6 Cans of beer, 6 Shots of liquor per week     Comment: drinks every day      Prior to Admission medications    Medication Sig Start Date End Date Taking? Authorizing Provider   ALLOPURINOL PO Take by mouth    Automatic Reconciliation, Ar   albuterol sulfate HFA (PROVENTIL;VENTOLIN;PROAIR) 108 (90 Base) MCG/ACT inhaler Inhale 2 puffs into the lungs every 4 hours as needed 2/10/21   Automatic Reconciliation, Ar   amLODIPine (NORVASC) 5 MG tablet take 1 tablet by mouth once daily 5/3/21   Automatic Reconciliation, Ar   diazePAM (VALIUM) 10 MG tablet Take 1 tablet by mouth 1 hour prior to imaging  Patient not taking: Reported on 5/31/2024 11/30/22   Automatic Reconciliation, Ar   ergocalciferol (ERGOCALCIFEROL) 1.25 MG (87204 UT) capsule Take 50,000 Units by mouth every 7 days  Patient not taking: Reported on 5/31/2024 8/31/22   Automatic Reconciliation, Ar   mometasone (ASMANEX) 220 MCG/ACT AEPB inhaler Inhale 220 mcg into the lungs daily  Patient not taking: Reported on 5/31/2024 11/18/19   Automatic Reconciliation, Ar   nicotine polacrilex (COMMIT) 2 MG lozenge Take 2 mg by mouth  Patient not taking: Reported on 5/31/2024 11/18/19   Automatic Reconciliation, Ar   sertraline (ZOLOFT) 25 MG tablet Take 25 mg by mouth daily  Patient not taking: Reported on 5/31/2024 10/10/22   Automatic Reconciliation, Ar   tiotropium-olodaterol (STIOLTO) 2.5-2.5 MCG/ACT AERS Inhale 2 puffs into the lungs daily  Patient not taking: Reported on 5/31/2024 11/18/19   Automatic Reconciliation, Ar        No Known Allergies    Review of Systems:  positive responses in bold type   Constitutional: Negative for fever, chills, diaphoresis and unexpected weight change.   HENT: Negative for ear pain, congestion, sore throat, rhinorrhea, drooling, trouble swallowing, neck pain and tinnitus.   Eyes: Negative for photophobia, pain, redness and visual disturbance.

## 2024-05-31 NOTE — ED NOTES
.TRANSFER - OUT REPORT:    Verbal report given to BRENT Win on Luc Patel  being transferred to  for continuum of care.    Report consisted of patient's Situation, Background, Assessment and   Recommendations(SBAR).     Information from the following report(s) Nurse Handoff Report was reviewed with the receiving nurse.    [place] Fall Assessment:    Presents to emergency department  because of falls (Syncope, seizure, or loss of consciousness): No  Age > 70: No  Altered Mental Status, Intoxication with alcohol or substance confusion (Disorientation, impaired judgment, poor safety awaremess, or inability to follow instructions): No  Impaired Mobility: Ambulates or transfers with assistive devices or assistance; Unable to ambulate or transer.: No  Nursing Judgement: Yes          Lines:   Peripheral IV 05/31/24 Distal;Right;Anterior Cephalic (Active)       Peripheral IV 05/31/24 Left Antecubital (Active)        Opportunity for questions and clarification was provided.

## 2024-05-31 NOTE — PLAN OF CARE
Problem: Pain  Goal: Verbalizes/displays adequate comfort level or baseline comfort level  5/31/2024 1732 by Audelia Valiente RN  Outcome: Progressing  5/31/2024 1527 by Audelia Valiente RN  Outcome: Progressing  Flowsheets (Taken 5/31/2024 1315)  Verbalizes/displays adequate comfort level or baseline comfort level:   Encourage patient to monitor pain and request assistance   Assess pain using appropriate pain scale   Administer analgesics based on type and severity of pain and evaluate response   Implement non-pharmacological measures as appropriate and evaluate response   Notify Licensed Independent Practitioner if interventions unsuccessful or patient reports new pain   Consider cultural and social influences on pain and pain management     Problem: Safety - Adult  Goal: Free from fall injury  5/31/2024 1732 by Audelia Valiente RN  Outcome: Progressing  5/31/2024 1527 by Audelia Valiente RN  Outcome: Progressing     Problem: Chronic Conditions and Co-morbidities  Goal: Patient's chronic conditions and co-morbidity symptoms are monitored and maintained or improved  5/31/2024 1732 by Audelia Valiente RN  Outcome: Progressing  5/31/2024 1527 by Audelia Valiente RN  Outcome: Progressing  Flowsheets (Taken 5/31/2024 1320)  Care Plan - Patient's Chronic Conditions and Co-Morbidity Symptoms are Monitored and Maintained or Improved:   Monitor and assess patient's chronic conditions and comorbid symptoms for stability, deterioration, or improvement   Collaborate with multidisciplinary team to address chronic and comorbid conditions and prevent exacerbation or deterioration   Update acute care plan with appropriate goals if chronic or comorbid symptoms are exacerbated and prevent overall improvement and discharge

## 2024-05-31 NOTE — CONSULTS
Diabetes Mother     Diabetes Brother        Allergy:   No Known Allergies    Patient Active Problem List   Diagnosis    COPD with acute exacerbation (HCC)    HTN (hypertension), malignant    Suspected COVID-19 virus infection    Chest pain radiating to arm    Health care maintenance    Hematuria, undiagnosed cause    Elevated LFTs    Type 2 diabetes mellitus with hyperglycemia, without long-term current use of insulin (HCC)    Thrombocytosis    Tobacco abuse    Hx of renal calculi    Hypokalemia    Prostate cancer (HCC)    Liver failure without hepatic coma (HCC)    Ascites due to alcoholic cirrhosis (HCC)       Home Medications:     Prior to Admission medications    Medication Sig Start Date End Date Taking? Authorizing Provider   ALLOPURINOL PO Take by mouth    Automatic Reconciliation, Ar   albuterol sulfate HFA (PROVENTIL;VENTOLIN;PROAIR) 108 (90 Base) MCG/ACT inhaler Inhale 2 puffs into the lungs every 4 hours as needed 2/10/21   Automatic Reconciliation, Ar   amLODIPine (NORVASC) 5 MG tablet take 1 tablet by mouth once daily 5/3/21   Automatic Reconciliation, Ar   diazePAM (VALIUM) 10 MG tablet Take 1 tablet by mouth 1 hour prior to imaging  Patient not taking: Reported on 5/31/2024 11/30/22   Automatic Reconciliation, Ar   ergocalciferol (ERGOCALCIFEROL) 1.25 MG (54616 UT) capsule Take 50,000 Units by mouth every 7 days  Patient not taking: Reported on 5/31/2024 8/31/22   Automatic Reconciliation, Ar   mometasone (ASMANEX) 220 MCG/ACT AEPB inhaler Inhale 220 mcg into the lungs daily  Patient not taking: Reported on 5/31/2024 11/18/19   Automatic Reconciliation, Ar   nicotine polacrilex (COMMIT) 2 MG lozenge Take 2 mg by mouth  Patient not taking: Reported on 5/31/2024 11/18/19   Automatic Reconciliation, Ar   sertraline (ZOLOFT) 25 MG tablet Take 25 mg by mouth daily  Patient not taking: Reported on 5/31/2024 10/10/22   Automatic Reconciliation, Ar   tiotropium-olodaterol (STIOLTO) 2.5-2.5 MCG/ACT AERS Inhale  2 puffs into the lungs daily  Patient not taking: Reported on 5/31/2024 11/18/19   Automatic Reconciliation, Ar       Review of Systems:     On CIWA  BP (!) 147/91   Pulse 94   Temp 97.2 °F (36.2 °C) (Oral)   Resp 18   Ht 1.651 m (5' 5\")   Wt 68.3 kg (150 lb 8 oz)   SpO2 100%   BMI 25.04 kg/m²     Physical Assessment:     constitutional: appearance: well developed, well nourished, normal habitus, no deformities, in no acute distress.   skin: inspection: no rashes, ulcers, icterus or other lesions; no clubbing or telangiectasias. palpation: no induration or subcutaneos nodules.   eyes: inspection: normal conjunctivae and lids; no jaundice pupils: normal  ENMT: mouth: normal oral mucosa,lips and gums; good dentition. oropharynx: normal tongue, hard and soft palate; posterior pharynx without erithema, exudate or lesions.   neck: thyroid: normal size, consistency and position; no masses or tenderness.   respiratory: effort: normal chest excursion; no intercostal retraction or accessory muscle use.   cardiovascular: abdominal aorta: normal size and position; no bruits. palpation: PMI of normal size and position; normal rhythm; no thrill or murmurs.   abdominal: abdomen: normal consistency; no tenderness or masses. hernias: no hernias appreciated. liver: normal size and consistency. spleen: not palpable.        Basic Metabolic Profile   Recent Labs     05/31/24 0421      K 3.4*      CO2 22   BUN 5*         CBC w/Diff    Recent Labs     05/31/24 0421   WBC 11.0   RBC 2.49*   HGB 7.5*   HCT 21.4*   MCV 85.9   MCH 30.1   MCHC 35.0   RDW 14.9*   *   MPV 9.5         Coags   Recent Labs     05/31/24 0421   INR 2.1*           Damaso Reaves MD.   [facility]  [phone]

## 2024-05-31 NOTE — ED NOTES
Assumed care of patient. Patient is resting with eyes closed and easily roused. Mirtha hugger applied to patient. Skin integrity intact, but dry. Patient endorses a taunt, distended stomach and bilateral + 2 pitting edema. No apparent distress with patient.

## 2024-05-31 NOTE — PROCEDURES
RADIOLOGY POST PARACENTESIS NOTE     May 31, 2024       2:54 PM     Preoperative Diagnosis:   Ascites    Postoperative Diagnosis:  Same.    :  PURA Adam    Attending: Deric Petit MD     Assistant:  None.    Type of Anesthesia: 1% plain lidocaine    Procedure/Description:  US-Guided paracentesis    Findings:  Using ultrasound guidance the largest pocket of peritoneal fluid was localized and marked at the right lower quadrant.   The patient was prepped and draped in the usual fashion.  1% Lidocaine was infiltrated locally. A 5 Lao over the needle catheter was advanced into the peritoneal cavity and clear, yellow fluid was aspirated. Once fluid was easily aspirated, the needle was removed leaving the catheter in place. The catheter was connected to vacuum containers and ascitic fluid was removed.    Estimated blood Loss:  Minimal    Specimen Removed:  yes    Complications: None    Condition: Stable    Impression: Successful paracentesis. 4.2 L ascites removed.    Discharge Plan:  continue present therapy    PURA Adam

## 2024-06-01 LAB
ALBUMIN SERPL-MCNC: 1.7 G/DL (ref 3.4–5)
ALBUMIN/GLOB SERPL: 0.3 (ref 0.8–1.7)
ALP SERPL-CCNC: 130 U/L (ref 45–117)
ALT SERPL-CCNC: 28 U/L (ref 16–61)
ANION GAP SERPL CALC-SCNC: 6 MMOL/L (ref 3–18)
AST SERPL-CCNC: 101 U/L (ref 10–38)
BASOPHILS # BLD: 0.1 K/UL (ref 0–0.1)
BASOPHILS NFR BLD: 1 % (ref 0–2)
BILIRUB SERPL-MCNC: 1.6 MG/DL (ref 0.2–1)
BUN SERPL-MCNC: 5 MG/DL (ref 7–18)
BUN/CREAT SERPL: 5 (ref 12–20)
C DIFF GDH STL QL: NEGATIVE
C DIFF TOX A+B STL QL IA: NEGATIVE
C DIFF TOXIN INTERPRETATION: NORMAL
CALCIUM SERPL-MCNC: 8.3 MG/DL (ref 8.5–10.1)
CHLORIDE SERPL-SCNC: 102 MMOL/L (ref 100–111)
CO2 SERPL-SCNC: 28 MMOL/L (ref 21–32)
CREAT SERPL-MCNC: 1.03 MG/DL (ref 0.6–1.3)
DIFFERENTIAL METHOD BLD: ABNORMAL
EOSINOPHIL # BLD: 0.5 K/UL (ref 0–0.4)
EOSINOPHIL NFR BLD: 4 % (ref 0–5)
ERYTHROCYTE [DISTWIDTH] IN BLOOD BY AUTOMATED COUNT: 14.5 % (ref 11.6–14.5)
FERRITIN SERPL-MCNC: 1517 NG/ML (ref 8–388)
GLOBULIN SER CALC-MCNC: 5.9 G/DL (ref 2–4)
GLUCOSE SERPL-MCNC: 98 MG/DL (ref 74–99)
HCT VFR BLD AUTO: 21.9 % (ref 36–48)
HGB BLD-MCNC: 7.4 G/DL (ref 13–16)
IMM GRANULOCYTES # BLD AUTO: 0 K/UL (ref 0–0.04)
IMM GRANULOCYTES NFR BLD AUTO: 0 % (ref 0–0.5)
INR PPP: 2 (ref 0.9–1.1)
IRON SATN MFR SERPL: 114 % (ref 20–50)
IRON SERPL-MCNC: 91 UG/DL (ref 50–175)
LYMPHOCYTES # BLD: 3.9 K/UL (ref 0.9–3.6)
LYMPHOCYTES NFR BLD: 31 % (ref 21–52)
MAGNESIUM SERPL-MCNC: 1.4 MG/DL (ref 1.6–2.6)
MCH RBC QN AUTO: 28.7 PG (ref 24–34)
MCHC RBC AUTO-ENTMCNC: 33.8 G/DL (ref 31–37)
MCV RBC AUTO: 84.9 FL (ref 78–100)
MONOCYTES # BLD: 1.4 K/UL (ref 0.05–1.2)
MONOCYTES NFR BLD: 11 % (ref 3–10)
NEUTS SEG # BLD: 6.8 K/UL (ref 1.8–8)
NEUTS SEG NFR BLD: 54 % (ref 40–73)
NRBC # BLD: 0 K/UL (ref 0–0.01)
NRBC BLD-RTO: 0 PER 100 WBC
PLATELET # BLD AUTO: 565 K/UL (ref 135–420)
PMV BLD AUTO: 10.2 FL (ref 9.2–11.8)
POTASSIUM SERPL-SCNC: 4.2 MMOL/L (ref 3.5–5.5)
PROT SERPL-MCNC: 7.6 G/DL (ref 6.4–8.2)
PROTHROMBIN TIME: 23.2 SEC (ref 11.9–14.7)
RBC # BLD AUTO: 2.58 M/UL (ref 4.35–5.65)
SODIUM SERPL-SCNC: 136 MMOL/L (ref 136–145)
TIBC SERPL-MCNC: 80 UG/DL (ref 250–450)
WBC # BLD AUTO: 12.8 K/UL (ref 4.6–13.2)

## 2024-06-01 PROCEDURE — 85025 COMPLETE CBC W/AUTO DIFF WBC: CPT

## 2024-06-01 PROCEDURE — 83550 IRON BINDING TEST: CPT

## 2024-06-01 PROCEDURE — 36415 COLL VENOUS BLD VENIPUNCTURE: CPT

## 2024-06-01 PROCEDURE — 2580000003 HC RX 258: Performed by: INTERNAL MEDICINE

## 2024-06-01 PROCEDURE — 82107 ALPHA-FETOPROTEIN L3: CPT

## 2024-06-01 PROCEDURE — 99232 SBSQ HOSP IP/OBS MODERATE 35: CPT | Performed by: EMERGENCY MEDICINE

## 2024-06-01 PROCEDURE — 1100000000 HC RM PRIVATE

## 2024-06-01 PROCEDURE — 97161 PT EVAL LOW COMPLEX 20 MIN: CPT

## 2024-06-01 PROCEDURE — 83735 ASSAY OF MAGNESIUM: CPT

## 2024-06-01 PROCEDURE — 6360000002 HC RX W HCPCS: Performed by: INTERNAL MEDICINE

## 2024-06-01 PROCEDURE — 6370000000 HC RX 637 (ALT 250 FOR IP): Performed by: PHYSICIAN ASSISTANT

## 2024-06-01 PROCEDURE — 85610 PROTHROMBIN TIME: CPT

## 2024-06-01 PROCEDURE — 6370000000 HC RX 637 (ALT 250 FOR IP): Performed by: INTERNAL MEDICINE

## 2024-06-01 PROCEDURE — 80053 COMPREHEN METABOLIC PANEL: CPT

## 2024-06-01 PROCEDURE — 94761 N-INVAS EAR/PLS OXIMETRY MLT: CPT

## 2024-06-01 PROCEDURE — 82728 ASSAY OF FERRITIN: CPT

## 2024-06-01 PROCEDURE — 87449 NOS EACH ORGANISM AG IA: CPT

## 2024-06-01 PROCEDURE — 83540 ASSAY OF IRON: CPT

## 2024-06-01 PROCEDURE — 87324 CLOSTRIDIUM AG IA: CPT

## 2024-06-01 RX ADMIN — PHYTONADIONE 5 MG: 10 INJECTION, EMULSION INTRAMUSCULAR; INTRAVENOUS; SUBCUTANEOUS at 09:59

## 2024-06-01 RX ADMIN — MORPHINE SULFATE 2 MG: 2 INJECTION, SOLUTION INTRAMUSCULAR; INTRAVENOUS at 17:41

## 2024-06-01 RX ADMIN — ENOXAPARIN SODIUM 40 MG: 100 INJECTION SUBCUTANEOUS at 08:22

## 2024-06-01 RX ADMIN — Medication 100 MG: at 08:21

## 2024-06-01 RX ADMIN — SODIUM CHLORIDE, PRESERVATIVE FREE 10 ML: 5 INJECTION INTRAVENOUS at 10:00

## 2024-06-01 RX ADMIN — SODIUM CHLORIDE, PRESERVATIVE FREE 10 ML: 5 INJECTION INTRAVENOUS at 10:01

## 2024-06-01 RX ADMIN — Medication 15 ML: at 08:21

## 2024-06-01 RX ADMIN — SODIUM CHLORIDE, PRESERVATIVE FREE 10 ML: 5 INJECTION INTRAVENOUS at 22:04

## 2024-06-01 RX ADMIN — AMLODIPINE BESYLATE 5 MG: 5 TABLET ORAL at 08:21

## 2024-06-01 RX ADMIN — LACTULOSE 20 G: 20 SOLUTION ORAL at 08:22

## 2024-06-01 RX ADMIN — SODIUM CHLORIDE, PRESERVATIVE FREE 10 ML: 5 INJECTION INTRAVENOUS at 22:03

## 2024-06-01 RX ADMIN — FOLIC ACID 1 MG: 1 TABLET ORAL at 08:21

## 2024-06-01 ASSESSMENT — PAIN SCALES - GENERAL
PAINLEVEL_OUTOF10: 0
PAINLEVEL_OUTOF10: 7
PAINLEVEL_OUTOF10: 0
PAINLEVEL_OUTOF10: 0

## 2024-06-01 ASSESSMENT — PAIN DESCRIPTION - LOCATION: LOCATION: LEG

## 2024-06-01 ASSESSMENT — PAIN DESCRIPTION - ORIENTATION: ORIENTATION: LEFT

## 2024-06-01 ASSESSMENT — PAIN DESCRIPTION - DESCRIPTORS: DESCRIPTORS: THROBBING

## 2024-06-01 NOTE — CARE COORDINATION
06/01/24 1043   Service Assessment   Patient Orientation Alert and Oriented   Cognition Alert   History Provided By Patient   Primary Caregiver Self   Accompanied By/Relationship none   Support Systems Family Members;Friends/Neighbors   Patient's Healthcare Decision Maker is: Patient Declined (Legal Next of Kin Remains as Decision Maker)   PCP Verified by CM Yes   Last Visit to PCP Within last 6 months   Prior Functional Level Independent in ADLs/IADLs   Current Functional Level Independent in ADLs/IADLs   Can patient return to prior living arrangement Yes   Ability to make needs known: Good   Family able to assist with home care needs: Yes   Would you like for me to discuss the discharge plan with any other family members/significant others, and if so, who? No   Financial Resources Medicare   Community Resources None   Social/Functional History   Lives With Family   Home Layout One level   Home Access Stairs to enter with rails   Entrance Stairs - Number of Steps 4 steps   Entrance Stairs - Rails Both   Bathroom Shower/Tub Tub/Shower unit   Bathroom Toilet Standard   Bathroom Equipment None   Bathroom Accessibility Accessible   Home Equipment Cane   Receives Help From Family   ADL Assistance Independent   Homemaking Assistance Independent   Homemaking Responsibilities No   Ambulation Assistance Independent   Transfer Assistance Independent   Active  No   Patient's  Info family/friends   Occupation Other(comment)   Type of Occupation states he receives social security   Discharge Planning   Type of Residence Homeless  (Patient states he stays with his step-sister at address listed on facesheet. He states he goes back and forth from her house to his uncle's house. Patient states at discharge, he will call someone to drive him to his step-sister's.)   Living Arrangements Family Members   Current Services Prior To Admission None   Potential Assistance Needed Other (Comment)   DME Ordered? No   Potential

## 2024-06-01 NOTE — PLAN OF CARE
Problem: Pain  Goal: Verbalizes/displays adequate comfort level or baseline comfort level  Outcome: Progressing  Flowsheets (Taken 6/1/2024 4818)  Verbalizes/displays adequate comfort level or baseline comfort level:   Encourage patient to monitor pain and request assistance   Assess pain using appropriate pain scale     Problem: Safety - Adult  Goal: Free from fall injury  Outcome: Progressing     Problem: Chronic Conditions and Co-morbidities  Goal: Patient's chronic conditions and co-morbidity symptoms are monitored and maintained or improved  Outcome: Progressing

## 2024-06-01 NOTE — CONSULTS
Comprehensive Nutrition Assessment      Type and Reason for Visit:  Initial, Patient Education    Nutrition Recommendations/Plan:   Diet as ordered, Add Ensure Plus High Protein (provides 350 kcal, 20g protein) Twice daily to maximize calories/protein intake opportunity  Provides MVT/MN daily  Continue to monitor tolerance of PO, compliance of oral supplements, weight, labs, and plan of care during admission.         Malnutrition Assessment:  Malnutrition Status:  Moderate malnutrition (06/01/24 1240)    Context:  Chronic Illness     Findings of the 6 clinical characteristics of malnutrition:  Energy Intake:  Mild decrease in energy intake (Comment) (Varies po intake PTA)  Weight Loss:  Unable to assess     Body Fat Loss:  Mild body fat loss Orbital, Triceps, Buccal region   Muscle Mass Loss:  Mild muscle mass loss Temples (temporalis), Clavicles (pectoralis & deltoids), Hand (interosseous)  Fluid Accumulation:  Mild Extremities, Ascites (+2, pitting)   Strength:  Normal  strength    Nutrition History and Allergies:   PMH of DM, COPD, HTN, kidney stones, Prostate CA, chronic alcoholism, smoking.  Wt hx (per EMR): 139 lb (3/10/23), 150 lb (5/31/24, admission wt) - no gain noticed, suspect fluid related. UBW per pt 151-155lb. Pt shares been losing wt and he feels skinnier. Po intake varies PTA. States can skip eating for days, but drinking daily. [facility]     Nutrition Assessment:    Admitted with increasing abdominal girth and bilateral lower extremity edema. Visited pt for nutrition education this AM. Education provided and somewhat accepted. Handouts left at bedside. Tolerating current diet well, pt reports finished 100% his breakfast. Consider pt malnourished risk, RD offered in-house ONS and pt gladly agreed.    Nutrition Education:  Educated on Low Na+ diet  Learners: Patient  Readiness: Somewhat accepted  Method: Explanation and Handout  Response: Needs Reinforcement    Nutrition Related Findings:    Meds:

## 2024-06-01 NOTE — PROGRESS NOTES
[email or web address]  [phone]    GASTROENTEROLOGY CONSULT      Impression:   60yo M with complex PMHx, notable for prostate cancer and nephrolithiasis who is admitted with new ascites. He has history of alcohol dependence and presents with alcoholic hepatitis. Certainly ascites could be from EtOH cirrhosis, but more likely to be from alc hep given normal platelets.    Literature is clear on use of pentoxifylline. No benefits. On steroids, it's not entirely clear due to the poor quality of the studies. In the largest meta-analysis completed there seems to be no benefit (Stef et al. [place] Database Syst Rev. 2017;11:NT821249. Epub 2017 Nov 2). Other smaller studies show perhaps some mortality improvement in the first month and it can be tracked by the Lille score (<0.45), however there is no long term benefit. Steroids are certainly not innocuous. They have several possible complications, and should be carefully considered in patients with active bleeding or acute renal failure. The highest mortality in this group arises from infections, and steroids could play a role worsening such a picture.    -SAAG >1.1 with low total protein favors portal hypertension as the cause for ascites.  -Please update echocardiogram and US doppler to rule out Budd-Chiari.  -Trend CMP and PT/INR daily.    Subjective: In good spirits this morning. Admits that this hospitalization has been a \"wake up\" call. Willing to stop drinking completely (was drinking beers and hard liquor daily).    PMH:   Past Medical History:   Diagnosis Date    Chronic lung disease     Chronic obstructive pulmonary disease (HCC)     Diabetes (HCC)     Diabetes mellitus (HCC)     Elevated PSA     Fast heart beat     periodically    Hematuria     Hypertension     Prostate cancer (HCC)     Prostate disease     Staghorn kidney stones        PSH:   Past Surgical History:   Procedure Laterality Date    CT BONE MARROW BIOPSY  2/15/2019    CT BONE

## 2024-06-01 NOTE — PROGRESS NOTES
Advance Care Planning     Advance Care Planning Inpatient Note  [facility] Department    Today's Date: 6/1/2024  Unit: [facility]    Received request from HealthCare Provider.  Upon review of chart and communication with care team, patient's decision making abilities are not in question.. Patient was/were present in the room during visit.    Goals of ACP Conversation:  Discuss advance care planning documents    Health Care Decision Makers:     No healthcare decision makers have been documented.  Click here to complete HealthCare Decision Makers including selection of the Healthcare Decision Maker Relationship (ie \"Primary\")  Summary:  No Decision Maker named by patient at this time    Advance Care Planning Documents (Patient Wishes):  None     Assessment:  Patient is not able to process results of all his lab work at this time and so he refused ACP conversation at this time.  Patient is only able to name his emergency contact: Kaye Alicea ( friend) at [phone].    Interventions:  Patient DECLINED ACP conversation    Care Preferences Communicated:   No    Outcomes/Plan:  ACP Discussion: Refused    Electronically signed by MAYA Zafar on 6/1/2024 at 3:19 PM

## 2024-06-01 NOTE — PLAN OF CARE
PHYSICAL THERAPY EVALUATION/DISCHARGE    Patient: Luc Patel (59 y.o. male)  Date: 6/1/2024  Primary Diagnosis: Bilateral lower extremity edema [R60.0]  Ascites due to alcoholic cirrhosis (HCC) [K70.31]  Liver failure without hepatic coma (HCC) [K72.90]  Acute liver failure without hepatic coma [K72.00]  Anemia, unspecified type [D64.9]  Alcoholic cirrhosis, unspecified whether ascites present (HCC) [K70.30]       Precautions: Fall Risk  PLOF: independent with functional mobility without an assistive device     ASSESSMENT AND RECOMMENDATIONS:  Pt was independent with all aspects of functional mobility without an assistive device.  Pt was not ambulated in the hallway due to being on precautions during evaluation.  Pt was left in bed per his request with needs in reach.    Patient does not require further skilled physical therapy intervention at this level of care.    Further Equipment Recommendations for Discharge: none    [facility]: AM-[facility] Inpatient Mobility Raw Score : 23    At this time and based on an AM-PAC score, no further PT is recommended upon discharge due to patient at baseline with functional mobility.  Recommend patient returns to prior setting with prior services.    This [facility] score should be considered in conjunction with interdisciplinary team recommendations to determine the most appropriate discharge setting. Patient's social support, diagnosis, medical stability, and prior level of function should also be taken into consideration.     SUBJECTIVE:   Patient stated “I'm not doing great but I can get up.”    OBJECTIVE DATA SUMMARY:     Past Medical History:   Diagnosis Date    Chronic lung disease     Chronic obstructive pulmonary disease (HCC)     Diabetes (HCC)     Diabetes mellitus (HCC)     Elevated PSA     Fast heart beat     periodically    Hematuria     Hypertension     Prostate cancer (HCC)     Prostate disease     Staghorn kidney stones      Past Surgical History:   Procedure Laterality Date

## 2024-06-02 ENCOUNTER — APPOINTMENT (OUTPATIENT)
Facility: HOSPITAL | Age: 59
DRG: 433 | End: 2024-06-02
Payer: MEDICARE

## 2024-06-02 ENCOUNTER — APPOINTMENT (OUTPATIENT)
Facility: HOSPITAL | Age: 59
DRG: 433 | End: 2024-06-02
Attending: EMERGENCY MEDICINE
Payer: MEDICARE

## 2024-06-02 LAB
BASOPHILS # BLD: 0.1 K/UL (ref 0–0.1)
BASOPHILS NFR BLD: 1 % (ref 0–2)
DIFFERENTIAL METHOD BLD: ABNORMAL
ECHO AO ASC DIAM: 3.2 CM
ECHO AO ASCENDING AORTA INDEX: 1.83 CM/M2
ECHO AO ROOT DIAM: 3.2 CM
ECHO AO ROOT INDEX: 1.83 CM/M2
ECHO BSA: 1.77 M2
ECHO EST RA PRESSURE: 8 MMHG
ECHO LA VOL A-L A2C: 45 ML (ref 18–58)
ECHO LA VOL A-L A4C: 54 ML (ref 18–58)
ECHO LA VOL BP: 47 ML (ref 18–58)
ECHO LA VOL MOD A2C: 42 ML (ref 18–58)
ECHO LA VOL MOD A4C: 48 ML (ref 18–58)
ECHO LA VOL/BSA BIPLANE: 27 ML/M2 (ref 16–34)
ECHO LA VOLUME AREA LENGTH: 51 ML
ECHO LA VOLUME INDEX A-L A2C: 26 ML/M2 (ref 16–34)
ECHO LA VOLUME INDEX A-L A4C: 31 ML/M2 (ref 16–34)
ECHO LA VOLUME INDEX AREA LENGTH: 29 ML/M2 (ref 16–34)
ECHO LA VOLUME INDEX MOD A2C: 24 ML/M2 (ref 16–34)
ECHO LA VOLUME INDEX MOD A4C: 27 ML/M2 (ref 16–34)
ECHO LV E' LATERAL VELOCITY: 12 CM/S
ECHO LV E' SEPTAL VELOCITY: 9 CM/S
ECHO LV FRACTIONAL SHORTENING: 26 % (ref 28–44)
ECHO LV INTERNAL DIMENSION DIASTOLE INDEX: 2.4 CM/M2
ECHO LV INTERNAL DIMENSION DIASTOLIC: 4.2 CM (ref 4.2–5.9)
ECHO LV INTERNAL DIMENSION SYSTOLIC INDEX: 1.77 CM/M2
ECHO LV INTERNAL DIMENSION SYSTOLIC: 3.1 CM
ECHO LV IVSD: 0.8 CM (ref 0.6–1)
ECHO LV MASS 2D: 109.8 G (ref 88–224)
ECHO LV MASS INDEX 2D: 62.8 G/M2 (ref 49–115)
ECHO LV POSTERIOR WALL DIASTOLIC: 0.9 CM (ref 0.6–1)
ECHO LV RELATIVE WALL THICKNESS RATIO: 0.43
ECHO LVOT AREA: 3.5 CM2
ECHO LVOT DIAM: 2.1 CM
ECHO LVOT MEAN GRADIENT: 3 MMHG
ECHO LVOT PEAK GRADIENT: 5 MMHG
ECHO LVOT PEAK VELOCITY: 1.1 M/S
ECHO LVOT STROKE VOLUME INDEX: 36.2 ML/M2
ECHO LVOT SV: 63.4 ML
ECHO LVOT VTI: 18.3 CM
ECHO MV A VELOCITY: 0.82 M/S
ECHO MV E DECELERATION TIME (DT): 224 MS
ECHO MV E VELOCITY: 0.73 M/S
ECHO MV E/A RATIO: 0.89
ECHO MV E/E' LATERAL: 6.08
ECHO MV E/E' RATIO (AVERAGED): 7.1
ECHO MV E/E' SEPTAL: 8.11
ECHO RV BASAL DIMENSION: 3.9 CM
ECHO RV FREE WALL PEAK S': 25 CM/S
ECHO RV TAPSE: 2.2 CM (ref 1.7–?)
EKG ATRIAL RATE: 87 BPM
EKG DIAGNOSIS: NORMAL
EKG P AXIS: 31 DEGREES
EKG P-R INTERVAL: 128 MS
EKG Q-T INTERVAL: 406 MS
EKG QRS DURATION: 74 MS
EKG QTC CALCULATION (BAZETT): 488 MS
EKG R AXIS: 17 DEGREES
EKG T AXIS: -2 DEGREES
EKG VENTRICULAR RATE: 87 BPM
EOSINOPHIL # BLD: 0.5 K/UL (ref 0–0.4)
EOSINOPHIL NFR BLD: 4 % (ref 0–5)
ERYTHROCYTE [DISTWIDTH] IN BLOOD BY AUTOMATED COUNT: 14.6 % (ref 11.6–14.5)
HCT VFR BLD AUTO: 22 % (ref 36–48)
HGB BLD-MCNC: 7.5 G/DL (ref 13–16)
IMM GRANULOCYTES # BLD AUTO: 0.1 K/UL (ref 0–0.04)
IMM GRANULOCYTES NFR BLD AUTO: 1 % (ref 0–0.5)
LYMPHOCYTES # BLD: 3.1 K/UL (ref 0.9–3.6)
LYMPHOCYTES NFR BLD: 23 % (ref 21–52)
MCH RBC QN AUTO: 29.1 PG (ref 24–34)
MCHC RBC AUTO-ENTMCNC: 34.1 G/DL (ref 31–37)
MCV RBC AUTO: 85.3 FL (ref 78–100)
MONOCYTES # BLD: 1.7 K/UL (ref 0.05–1.2)
MONOCYTES NFR BLD: 13 % (ref 3–10)
NEUTS SEG # BLD: 7.9 K/UL (ref 1.8–8)
NEUTS SEG NFR BLD: 59 % (ref 40–73)
NRBC # BLD: 0 K/UL (ref 0–0.01)
NRBC BLD-RTO: 0 PER 100 WBC
PLATELET # BLD AUTO: 587 K/UL (ref 135–420)
PMV BLD AUTO: 10.2 FL (ref 9.2–11.8)
RBC # BLD AUTO: 2.58 M/UL (ref 4.35–5.65)
WBC # BLD AUTO: 13.4 K/UL (ref 4.6–13.2)

## 2024-06-02 PROCEDURE — 99232 SBSQ HOSP IP/OBS MODERATE 35: CPT | Performed by: EMERGENCY MEDICINE

## 2024-06-02 PROCEDURE — 94761 N-INVAS EAR/PLS OXIMETRY MLT: CPT

## 2024-06-02 PROCEDURE — 2580000003 HC RX 258: Performed by: INTERNAL MEDICINE

## 2024-06-02 PROCEDURE — 6370000000 HC RX 637 (ALT 250 FOR IP): Performed by: INTERNAL MEDICINE

## 2024-06-02 PROCEDURE — 93010 ELECTROCARDIOGRAM REPORT: CPT | Performed by: INTERNAL MEDICINE

## 2024-06-02 PROCEDURE — 36415 COLL VENOUS BLD VENIPUNCTURE: CPT

## 2024-06-02 PROCEDURE — 2580000003 HC RX 258: Performed by: EMERGENCY MEDICINE

## 2024-06-02 PROCEDURE — 1100000000 HC RM PRIVATE

## 2024-06-02 PROCEDURE — 71045 X-RAY EXAM CHEST 1 VIEW: CPT

## 2024-06-02 PROCEDURE — 93306 TTE W/DOPPLER COMPLETE: CPT

## 2024-06-02 PROCEDURE — 85025 COMPLETE CBC W/AUTO DIFF WBC: CPT

## 2024-06-02 PROCEDURE — 93306 TTE W/DOPPLER COMPLETE: CPT | Performed by: INTERNAL MEDICINE

## 2024-06-02 PROCEDURE — 6360000002 HC RX W HCPCS: Performed by: EMERGENCY MEDICINE

## 2024-06-02 PROCEDURE — 6360000002 HC RX W HCPCS: Performed by: INTERNAL MEDICINE

## 2024-06-02 RX ADMIN — SODIUM CHLORIDE, PRESERVATIVE FREE 10 ML: 5 INJECTION INTRAVENOUS at 08:36

## 2024-06-02 RX ADMIN — Medication 100 MG: at 08:36

## 2024-06-02 RX ADMIN — MORPHINE SULFATE 2 MG: 2 INJECTION, SOLUTION INTRAMUSCULAR; INTRAVENOUS at 13:40

## 2024-06-02 RX ADMIN — AMLODIPINE BESYLATE 5 MG: 5 TABLET ORAL at 08:36

## 2024-06-02 RX ADMIN — SODIUM CHLORIDE, PRESERVATIVE FREE 10 ML: 5 INJECTION INTRAVENOUS at 22:11

## 2024-06-02 RX ADMIN — LORAZEPAM 1 MG: 1 TABLET ORAL at 22:04

## 2024-06-02 RX ADMIN — MORPHINE SULFATE 2 MG: 2 INJECTION, SOLUTION INTRAMUSCULAR; INTRAVENOUS at 00:45

## 2024-06-02 RX ADMIN — FOLIC ACID 1 MG: 1 TABLET ORAL at 08:36

## 2024-06-02 RX ADMIN — ENOXAPARIN SODIUM 40 MG: 100 INJECTION SUBCUTANEOUS at 08:36

## 2024-06-02 RX ADMIN — Medication 15 ML: at 08:36

## 2024-06-02 RX ADMIN — ACETAMINOPHEN 325MG 650 MG: 325 TABLET ORAL at 22:04

## 2024-06-02 RX ADMIN — WATER 1000 MG: 1 INJECTION INTRAMUSCULAR; INTRAVENOUS; SUBCUTANEOUS at 22:07

## 2024-06-02 RX ADMIN — MORPHINE SULFATE 2 MG: 2 INJECTION, SOLUTION INTRAMUSCULAR; INTRAVENOUS at 22:05

## 2024-06-02 ASSESSMENT — PAIN - FUNCTIONAL ASSESSMENT
PAIN_FUNCTIONAL_ASSESSMENT: PREVENTS OR INTERFERES SOME ACTIVE ACTIVITIES AND ADLS

## 2024-06-02 ASSESSMENT — PAIN DESCRIPTION - DESCRIPTORS
DESCRIPTORS: SQUEEZING;TIGHTNESS
DESCRIPTORS: ACHING
DESCRIPTORS: CRAMPING;ACHING;DISCOMFORT;SORE

## 2024-06-02 ASSESSMENT — PAIN SCALES - GENERAL
PAINLEVEL_OUTOF10: 3
PAINLEVEL_OUTOF10: 7
PAINLEVEL_OUTOF10: 0
PAINLEVEL_OUTOF10: 6
PAINLEVEL_OUTOF10: 7
PAINLEVEL_OUTOF10: 0
PAINLEVEL_OUTOF10: 6
PAINLEVEL_OUTOF10: 0

## 2024-06-02 ASSESSMENT — PAIN DESCRIPTION - ORIENTATION
ORIENTATION: LEFT;RIGHT
ORIENTATION: LEFT;RIGHT
ORIENTATION: RIGHT;LEFT
ORIENTATION: RIGHT;LEFT

## 2024-06-02 ASSESSMENT — PAIN DESCRIPTION - LOCATION
LOCATION: LEG
LOCATION: BACK;LEG
LOCATION: LEG
LOCATION: LEG

## 2024-06-02 ASSESSMENT — PAIN DESCRIPTION - ONSET
ONSET: ON-GOING
ONSET: ON-GOING

## 2024-06-02 ASSESSMENT — PAIN DESCRIPTION - FREQUENCY
FREQUENCY: CONTINUOUS
FREQUENCY: CONTINUOUS

## 2024-06-02 ASSESSMENT — PAIN DESCRIPTION - PAIN TYPE
TYPE: CHRONIC PAIN

## 2024-06-02 NOTE — PROGRESS NOTES
Occupational Therapy  OT order received and chart reviewed.  Spoke with patient in his room and he declined need for skilled OT at this time.  Patient is modified independent with basic self care tasks and functional mobility. He has assistance/support at home.  No skilled OT indicated at this time; will complete the order.      Thank you for the referral.   Steffi Rivera OTD, OTR/L

## 2024-06-02 NOTE — PROGRESS NOTES
[email or web address]  [phone]    GASTROENTEROLOGY CONSULT      Impression:   58yo M with complex PMHx, notable for prostate cancer and nephrolithiasis who is admitted with new ascites. He has history of alcohol dependence and presents with alcoholic hepatitis. Certainly ascites could be from EtOH cirrhosis, but more likely to be from alc hep given normal platelets.    Literature is clear on use of pentoxifylline. No benefits. On steroids, it's not entirely clear due to the poor quality of the studies. In the largest meta-analysis completed there seems to be no benefit (Stef et al. [place] Database Syst Rev. 2017;11:EO783594. Epub 2017 Nov 2). Other smaller studies show perhaps some mortality improvement in the first month and it can be tracked by the Lille score (<0.45), however there is no long term benefit. Steroids are certainly not innocuous. They have several possible complications, and should be carefully considered in patients with active bleeding or acute renal failure. The highest mortality in this group arises from infections, and steroids could play a role worsening such a picture.    -SAAG >1.1 with low total protein favors portal hypertension as the cause for ascites. No SBP.  -Please update echocardiogram and US doppler to rule out Budd-Chiari.  -Trend CMP and PT/INR daily.    Subjective: Mentions having a hard time sleeping overnight. No withdrawal symptoms.     PMH:   Past Medical History:   Diagnosis Date    Chronic lung disease     Chronic obstructive pulmonary disease (HCC)     Diabetes (HCC)     Diabetes mellitus (HCC)     Elevated PSA     Fast heart beat     periodically    Hematuria     Hypertension     Prostate cancer (HCC)     Prostate disease     Staghorn kidney stones        PSH:   Past Surgical History:   Procedure Laterality Date    CT BONE MARROW BIOPSY  2/15/2019    CT BONE MARROW BIOPSY 2/15/2019    IR BIOPSY PERC SUPERF BONE      OTHER SURGICAL HISTORY   HTN (hypertension), malignant    Suspected COVID-19 virus infection    Chest pain radiating to [facility] maintenance    Hematuria, undiagnosed cause    Elevated LFTs    Type 2 diabetes mellitus with hyperglycemia, without long-term current use of insulin (HCC)    Thrombocytosis    Tobacco abuse    Hx of renal calculi    Hypokalemia    Prostate cancer (HCC)    Liver failure without hepatic coma (HCC)    Ascites due to alcoholic cirrhosis (HCC)       Home Medications:     Prior to Admission medications    Medication Sig Start Date End Date Taking? Authorizing Provider   ALLOPURINOL PO Take by mouth    Automatic Reconciliation, Ar   albuterol sulfate HFA (PROVENTIL;VENTOLIN;PROAIR) 108 (90 Base) MCG/ACT inhaler Inhale 2 puffs into the lungs every 4 hours as needed 2/10/21   Automatic Reconciliation, Ar   amLODIPine (NORVASC) 5 MG tablet take 1 tablet by mouth once daily 5/3/21   Automatic Reconciliation, Ar   diazePAM (VALIUM) 10 MG tablet Take 1 tablet by mouth 1 hour prior to imaging  Patient not taking: Reported on 5/31/2024 11/30/22   Automatic Reconciliation, Ar   ergocalciferol (ERGOCALCIFEROL) 1.25 MG (42944 UT) capsule Take 50,000 Units by mouth every 7 days  Patient not taking: Reported on 5/31/2024 8/31/22   Automatic Reconciliation, Ar   mometasone (ASMANEX) 220 MCG/ACT AEPB inhaler Inhale 220 mcg into the lungs daily  Patient not taking: Reported on 5/31/2024 11/18/19   Automatic Reconciliation, Ar   nicotine polacrilex (COMMIT) 2 MG lozenge Take 2 mg by mouth  Patient not taking: Reported on 5/31/2024 11/18/19   Automatic Reconciliation, Ar   sertraline (ZOLOFT) 25 MG tablet Take 25 mg by mouth daily  Patient not taking: Reported on 5/31/2024 10/10/22   Automatic Reconciliation, Ar   tiotropium-olodaterol (STIOLTO) 2.5-2.5 MCG/ACT AERS Inhale 2 puffs into the lungs daily  Patient not taking: Reported on 5/31/2024 11/18/19   Automatic Reconciliation, Ar       Review of Systems:     Neg  /72

## 2024-06-02 NOTE — PROGRESS NOTES
Please keep patient NPO after midnight for portal vein ultrasound to be performed on 6/3/2024 (AM).

## 2024-06-02 NOTE — PLAN OF CARE
Problem: Pain  Goal: Verbalizes/displays adequate comfort level or baseline comfort level  Outcome: Progressing     Problem: Safety - Adult  Goal: Free from fall injury  Outcome: Progressing     Problem: Chronic Conditions and Co-morbidities  Goal: Patient's chronic conditions and co-morbidity symptoms are monitored and maintained or improved  Outcome: Progressing  Flowsheets (Taken 6/2/2024 0000 by Theodore Helms RN)  Care Plan - Patient's Chronic Conditions and Co-Morbidity Symptoms are Monitored and Maintained or Improved: Monitor and assess patient's chronic conditions and comorbid symptoms for stability, deterioration, or improvement     Problem: Nutrition Deficit:  Goal: Optimize nutritional status  Outcome: Progressing

## 2024-06-02 NOTE — PROGRESS NOTES
[facility] Hospitalist Group  Progress Note    Patient: Luc Patel Age: 59 y.o. : 1965 MR#: [id number] SSN: [id number]  Date/Time: 2024    Subjective:     -I personally saw and evaluated this patient face-to-face today.  Patient is sitting in bed in no apparent distress    Assessment/Plan:     Ascites likely secondary to alcoholic liver disease  Alcohol abuse  Hypertension  Anemia, appears to be chronic.  No overt bleeding  Tobacco use  Bilateral lower extremity edema    Plan  GI is following  Follow her echocardiogram and ultrasound Doppler of portal and splenic vein  I counseled patient regarding lifelong abstinence from alcohol.  Patient verbalized understanding and agreed.  I also counseled patient regarding smoking cessation and patient verbalized understanding.  Continue amlodipine  Lower extremity duplex is negative for DVT  PT OT  Leukocytosis noted.  Patient patient denies any urinary symptoms or cough or diarrhea.  Check a UA CNS and chest x-ray.  Will start empiric ceftriaxone.  De-escalate based on test results  Recheck labs and electrolytes in the morning      Disposition-likely home with home health care on Amanda 3 or     Case discussed with:  [x]Patient  []Family  []Nursing  []Case Management  DVT Prophylaxis:  [x]Lovenox  []Hep SQ  []SCDs  []Coumadin   []On Heparin gtt    Objective:   VS: /72   Pulse (!) 101   Temp 98.2 °F (36.8 °C) (Oral)   Resp 17   Ht 1.651 m (5' 5\")   Wt 68.3 kg (150 lb 8 oz)   SpO2 94%   BMI 25.04 kg/m²    Tmax/24hrs: Temp (24hrs), Av.1 °F (37.3 °C), Min:98.2 °F (36.8 °C), Max:100 °F (37.8 °C)    Input/Output:   Intake/Output Summary (Last 24 hours) at 2024 1153  Last data filed at 2024 0100  Gross per 24 hour   Intake 600 ml   Output --   Net 600 ml       General:  Awake, alert  Cardiovascular:  S1S2+, RRR  Pulmonary:  CTA b/l  GI:  Soft, BS+, NT, ND, has ascites  Extremities: Has 2+ edema bilateral lower

## 2024-06-02 NOTE — PROGRESS NOTES
[facility] Hospitalist Group  Progress Note    Patient: Luc Patel Age: 59 y.o. : 1965 MR#: [id number] SSN: [id number]  Date/Time: 2024    Subjective:     I personally saw and evaluated this patient face-to-face today.  Patient is sitting in bed in no apparent distress, awake and alert.  Denies any nausea vomiting or abdominal pain    Assessment/Plan:     Ascites likely secondary to alcoholic liver disease  Alcohol abuse  Hypertension  Anemia, appears to be chronic.  No overt bleeding  Tobacco use  Bilateral lower extremity edema    Plan  GI is following  I have ordered the echocardiogram and ultrasound Doppler of portal and splenic vein as recommended by GI  I counseled patient regarding lifelong abstinence from alcohol.  Patient verbalized understanding and agreed.  I also counseled patient regarding smoking cessation and patient verbalized understanding.  Continue amlodipine  Lower extremity duplex is negative for DVT  PT OT      Disposition-likely home with home health care on Amanda 3    Case discussed with:  [x]Patient  []Family  []Nursing  []Case Management  DVT Prophylaxis:  [x]Lovenox  []Hep SQ  []SCDs  []Coumadin   []On Heparin gtt    Objective:   VS: /67   Pulse (!) 101   Temp 98.6 °F (37 °C) (Oral)   Resp 19   Ht 1.651 m (5' 5\")   Wt 68.3 kg (150 lb 8 oz)   SpO2 96%   BMI 25.04 kg/m²    Tmax/24hrs: Temp (24hrs), Av.1 °F (36.7 °C), Min:97.8 °F (36.6 °C), Max:98.6 °F (37 °C)    Input/Output:   Intake/Output Summary (Last 24 hours) at 2024  Last data filed at 2024 0844  Gross per 24 hour   Intake 480 ml   Output 200 ml   Net 280 ml       General:  Awake, alert  Cardiovascular:  S1S2+, RRR  Pulmonary:  CTA b/l  GI:  Soft, BS+, NT, ND, has ascites  Extremities: Has bilateral 2+ edema.  Pulses are 1+ bilaterally equal       Labs:    Recent Results (from the past 24 hour(s))   CBC with Auto Differential    Collection Time: 24  6:24 AM  5.0 g/dL    Globulin 5.9 (H) 2.0 - 4.0 g/dL    Albumin/Globulin Ratio 0.3 (L) 0.8 - 1.7     Protime-INR    Collection Time: 06/01/24  6:24 AM   Result Value Ref Range    Protime 23.2 (H) 11.9 - 14.7 sec    INR 2.0 (H) 0.9 - 1.1     Clostridium Difficile Toxin/Antigen    Collection Time: 06/01/24  6:59 AM    Specimen: Stool   Result Value Ref Range    GDH Antigen Negative NEG      C difficile Toxin, EIA Negative NEG      C Diff Toxin Interpretation NEGATIVE FOR TOXIGENIC C. DIFFICILE NTXCD       Additional Data Reviewed:      Dragon medical dictation software was used for portions of this report. Unintended errors may occur.      Signed By: Thad Tripathi MD     June 1, 2024 8:02 PM

## 2024-06-03 ENCOUNTER — APPOINTMENT (OUTPATIENT)
Facility: HOSPITAL | Age: 59
DRG: 433 | End: 2024-06-03
Payer: MEDICARE

## 2024-06-03 ENCOUNTER — APPOINTMENT (OUTPATIENT)
Facility: HOSPITAL | Age: 59
DRG: 433 | End: 2024-06-03
Attending: EMERGENCY MEDICINE
Payer: MEDICARE

## 2024-06-03 LAB
AFP L3 MFR SERPL: NORMAL % (ref 0–9.9)
AFP SERPL-MCNC: 4.4 NG/ML (ref 0–8.4)
ALBUMIN SERPL-MCNC: 1.4 G/DL (ref 3.4–5)
ALBUMIN/GLOB SERPL: 0.3 (ref 0.8–1.7)
ALP SERPL-CCNC: 117 U/L (ref 45–117)
ALT SERPL-CCNC: 22 U/L (ref 16–61)
ANION GAP SERPL CALC-SCNC: 5 MMOL/L (ref 3–18)
APPEARANCE UR: ABNORMAL
AST SERPL-CCNC: 73 U/L (ref 10–38)
BACTERIA URNS QL MICRO: ABNORMAL /HPF
BASOPHILS # BLD: 0.2 K/UL (ref 0–0.1)
BASOPHILS NFR BLD: 1 % (ref 0–2)
BILIRUB DIRECT SERPL-MCNC: 0.9 MG/DL (ref 0–0.2)
BILIRUB SERPL-MCNC: 1.5 MG/DL (ref 0.2–1)
BILIRUB UR QL: ABNORMAL
BUN SERPL-MCNC: 8 MG/DL (ref 7–18)
BUN/CREAT SERPL: 7 (ref 12–20)
CALCIUM SERPL-MCNC: 7.6 MG/DL (ref 8.5–10.1)
CHLORIDE SERPL-SCNC: 100 MMOL/L (ref 100–111)
CO2 SERPL-SCNC: 28 MMOL/L (ref 21–32)
COLOR UR: ABNORMAL
CREAT SERPL-MCNC: 1.08 MG/DL (ref 0.6–1.3)
DIFFERENTIAL METHOD BLD: ABNORMAL
EOSINOPHIL # BLD: 0 K/UL (ref 0–0.4)
EOSINOPHIL NFR BLD: 0 % (ref 0–5)
EPITH CASTS URNS QL MICRO: NEGATIVE /LPF (ref 0–5)
ERYTHROCYTE [DISTWIDTH] IN BLOOD BY AUTOMATED COUNT: 14.5 % (ref 11.6–14.5)
FOLATE SERPL-MCNC: 5.1 NG/ML (ref 3.1–17.5)
GLOBULIN SER CALC-MCNC: 5.5 G/DL (ref 2–4)
GLUCOSE SERPL-MCNC: 101 MG/DL (ref 74–99)
GLUCOSE UR STRIP.AUTO-MCNC: NEGATIVE MG/DL
HCT VFR BLD AUTO: 17.1 % (ref 36–48)
HCT VFR BLD AUTO: 20.7 % (ref 36–48)
HGB BLD-MCNC: 6.2 G/DL (ref 13–16)
HGB BLD-MCNC: 7.2 G/DL (ref 13–16)
HGB UR QL STRIP: NEGATIVE
IMM GRANULOCYTES # BLD AUTO: 0 K/UL (ref 0–0.04)
IMM GRANULOCYTES NFR BLD AUTO: 0 % (ref 0–0.5)
INR PPP: 1.9 (ref 0.9–1.1)
KETONES UR QL STRIP.AUTO: ABNORMAL MG/DL
LEUKOCYTE ESTERASE UR QL STRIP.AUTO: ABNORMAL
LYMPHOCYTES # BLD: 2.1 K/UL (ref 0.9–3.6)
LYMPHOCYTES NFR BLD: 13 % (ref 21–52)
MAGNESIUM SERPL-MCNC: 1.1 MG/DL (ref 1.6–2.6)
MCH RBC QN AUTO: 30 PG (ref 24–34)
MCHC RBC AUTO-ENTMCNC: 36.3 G/DL (ref 31–37)
MCV RBC AUTO: 82.6 FL (ref 78–100)
MONOCYTES # BLD: 0.8 K/UL (ref 0.05–1.2)
MONOCYTES NFR BLD: 5 % (ref 3–10)
NEUTS SEG # BLD: 13 K/UL (ref 1.8–8)
NEUTS SEG NFR BLD: 81 % (ref 40–73)
NITRITE UR QL STRIP.AUTO: POSITIVE
NRBC # BLD: 0 K/UL (ref 0–0.01)
NRBC BLD-RTO: 0 PER 100 WBC
PH UR STRIP: 5.5 (ref 5–8)
PLATELET # BLD AUTO: 483 K/UL (ref 135–420)
PLATELET COMMENT: ABNORMAL
PMV BLD AUTO: 9.9 FL (ref 9.2–11.8)
POTASSIUM SERPL-SCNC: 3.2 MMOL/L (ref 3.5–5.5)
PROT SERPL-MCNC: 6.9 G/DL (ref 6.4–8.2)
PROT UR STRIP-MCNC: ABNORMAL MG/DL
PROTHROMBIN TIME: 21.7 SEC (ref 11.9–14.7)
RBC # BLD AUTO: 2.07 M/UL (ref 4.35–5.65)
RBC #/AREA URNS HPF: NEGATIVE /HPF (ref 0–5)
RBC MORPH BLD: ABNORMAL
SODIUM SERPL-SCNC: 133 MMOL/L (ref 136–145)
SP GR UR REFRACTOMETRY: 1.03 (ref 1–1.03)
UROBILINOGEN UR QL STRIP.AUTO: 1 EU/DL (ref 0.2–1)
VIT B12 SERPL-MCNC: >2000 PG/ML (ref 211–911)
WBC # BLD AUTO: 16.1 K/UL (ref 4.6–13.2)
WBC MORPH BLD: ABNORMAL
WBC URNS QL MICRO: ABNORMAL /HPF (ref 0–4)

## 2024-06-03 PROCEDURE — 87186 SC STD MICRODIL/AGAR DIL: CPT

## 2024-06-03 PROCEDURE — 80076 HEPATIC FUNCTION PANEL: CPT

## 2024-06-03 PROCEDURE — 0W9G3ZZ DRAINAGE OF PERITONEAL CAVITY, PERCUTANEOUS APPROACH: ICD-10-PCS | Performed by: STUDENT IN AN ORGANIZED HEALTH CARE EDUCATION/TRAINING PROGRAM

## 2024-06-03 PROCEDURE — 85025 COMPLETE CBC W/AUTO DIFF WBC: CPT

## 2024-06-03 PROCEDURE — 2709999900 US GUIDED PARACENTESIS

## 2024-06-03 PROCEDURE — 85018 HEMOGLOBIN: CPT

## 2024-06-03 PROCEDURE — 82746 ASSAY OF FOLIC ACID SERUM: CPT

## 2024-06-03 PROCEDURE — 86901 BLOOD TYPING SEROLOGIC RH(D): CPT

## 2024-06-03 PROCEDURE — 87086 URINE CULTURE/COLONY COUNT: CPT

## 2024-06-03 PROCEDURE — 6360000002 HC RX W HCPCS: Performed by: INTERNAL MEDICINE

## 2024-06-03 PROCEDURE — 81001 URINALYSIS AUTO W/SCOPE: CPT

## 2024-06-03 PROCEDURE — 85610 PROTHROMBIN TIME: CPT

## 2024-06-03 PROCEDURE — 6360000002 HC RX W HCPCS: Performed by: EMERGENCY MEDICINE

## 2024-06-03 PROCEDURE — 86850 RBC ANTIBODY SCREEN: CPT

## 2024-06-03 PROCEDURE — 1100000000 HC RM PRIVATE

## 2024-06-03 PROCEDURE — 93976 VASCULAR STUDY: CPT

## 2024-06-03 PROCEDURE — 6370000000 HC RX 637 (ALT 250 FOR IP): Performed by: HOSPITALIST

## 2024-06-03 PROCEDURE — 2580000003 HC RX 258: Performed by: INTERNAL MEDICINE

## 2024-06-03 PROCEDURE — 83735 ASSAY OF MAGNESIUM: CPT

## 2024-06-03 PROCEDURE — 86900 BLOOD TYPING SEROLOGIC ABO: CPT

## 2024-06-03 PROCEDURE — 86923 COMPATIBILITY TEST ELECTRIC: CPT

## 2024-06-03 PROCEDURE — 85014 HEMATOCRIT: CPT

## 2024-06-03 PROCEDURE — 99232 SBSQ HOSP IP/OBS MODERATE 35: CPT | Performed by: STUDENT IN AN ORGANIZED HEALTH CARE EDUCATION/TRAINING PROGRAM

## 2024-06-03 PROCEDURE — 94761 N-INVAS EAR/PLS OXIMETRY MLT: CPT

## 2024-06-03 PROCEDURE — 36415 COLL VENOUS BLD VENIPUNCTURE: CPT

## 2024-06-03 PROCEDURE — 2580000003 HC RX 258: Performed by: EMERGENCY MEDICINE

## 2024-06-03 PROCEDURE — 80048 BASIC METABOLIC PNL TOTAL CA: CPT

## 2024-06-03 PROCEDURE — 6370000000 HC RX 637 (ALT 250 FOR IP): Performed by: INTERNAL MEDICINE

## 2024-06-03 PROCEDURE — 87088 URINE BACTERIA CULTURE: CPT

## 2024-06-03 PROCEDURE — 82607 VITAMIN B-12: CPT

## 2024-06-03 RX ORDER — LANOLIN ALCOHOL/MO/W.PET/CERES
400 CREAM (GRAM) TOPICAL 2 TIMES DAILY
Status: DISCONTINUED | OUTPATIENT
Start: 2024-06-03 | End: 2024-06-05 | Stop reason: HOSPADM

## 2024-06-03 RX ADMIN — MORPHINE SULFATE 2 MG: 2 INJECTION, SOLUTION INTRAMUSCULAR; INTRAVENOUS at 16:43

## 2024-06-03 RX ADMIN — Medication 100 MG: at 09:57

## 2024-06-03 RX ADMIN — Medication 15 ML: at 09:57

## 2024-06-03 RX ADMIN — MORPHINE SULFATE 2 MG: 2 INJECTION, SOLUTION INTRAMUSCULAR; INTRAVENOUS at 22:00

## 2024-06-03 RX ADMIN — MAGNESIUM SULFATE HEPTAHYDRATE 2000 MG: 40 INJECTION, SOLUTION INTRAVENOUS at 05:34

## 2024-06-03 RX ADMIN — WATER 1000 MG: 1 INJECTION INTRAMUSCULAR; INTRAVENOUS; SUBCUTANEOUS at 20:45

## 2024-06-03 RX ADMIN — SODIUM CHLORIDE, PRESERVATIVE FREE 10 ML: 5 INJECTION INTRAVENOUS at 20:48

## 2024-06-03 RX ADMIN — POTASSIUM BICARBONATE 40 MEQ: 782 TABLET, EFFERVESCENT ORAL at 20:45

## 2024-06-03 RX ADMIN — Medication 400 MG: at 09:57

## 2024-06-03 RX ADMIN — FOLIC ACID 1 MG: 1 TABLET ORAL at 09:57

## 2024-06-03 RX ADMIN — AMLODIPINE BESYLATE 5 MG: 5 TABLET ORAL at 09:57

## 2024-06-03 RX ADMIN — Medication 400 MG: at 20:45

## 2024-06-03 RX ADMIN — SODIUM CHLORIDE, PRESERVATIVE FREE 10 ML: 5 INJECTION INTRAVENOUS at 16:44

## 2024-06-03 RX ADMIN — POTASSIUM BICARBONATE 40 MEQ: 782 TABLET, EFFERVESCENT ORAL at 09:57

## 2024-06-03 RX ADMIN — SODIUM CHLORIDE, PRESERVATIVE FREE 10 ML: 5 INJECTION INTRAVENOUS at 09:57

## 2024-06-03 RX ADMIN — SODIUM CHLORIDE, PRESERVATIVE FREE 10 ML: 5 INJECTION INTRAVENOUS at 16:43

## 2024-06-03 RX ADMIN — MAGNESIUM SULFATE HEPTAHYDRATE 2000 MG: 40 INJECTION, SOLUTION INTRAVENOUS at 07:43

## 2024-06-03 ASSESSMENT — ENCOUNTER SYMPTOMS
COLOR CHANGE: 0
COUGH: 0
WHEEZING: 0
HEMATOCHEZIA: 0
SHORTNESS OF BREATH: 0
CONSTIPATION: 0
DIARRHEA: 0
EYE PAIN: 0
NAUSEA: 0
PHOTOPHOBIA: 0
VOMITING: 0
SORE THROAT: 0
ABDOMINAL PAIN: 0

## 2024-06-03 ASSESSMENT — PAIN SCALES - GENERAL
PAINLEVEL_OUTOF10: 0
PAINLEVEL_OUTOF10: 7
PAINLEVEL_OUTOF10: 0
PAINLEVEL_OUTOF10: 6
PAINLEVEL_OUTOF10: 0

## 2024-06-03 ASSESSMENT — PAIN DESCRIPTION - ORIENTATION
ORIENTATION: RIGHT;LEFT
ORIENTATION: POSTERIOR;LEFT;RIGHT

## 2024-06-03 ASSESSMENT — PAIN DESCRIPTION - DESCRIPTORS
DESCRIPTORS: ACHING
DESCRIPTORS: THROBBING

## 2024-06-03 ASSESSMENT — PAIN - FUNCTIONAL ASSESSMENT: PAIN_FUNCTIONAL_ASSESSMENT: ACTIVITIES ARE NOT PREVENTED

## 2024-06-03 ASSESSMENT — PAIN DESCRIPTION - PAIN TYPE: TYPE: ACUTE PAIN

## 2024-06-03 ASSESSMENT — PAIN DESCRIPTION - FREQUENCY: FREQUENCY: CONTINUOUS

## 2024-06-03 ASSESSMENT — PAIN DESCRIPTION - ONSET: ONSET: GRADUAL

## 2024-06-03 ASSESSMENT — PAIN DESCRIPTION - LOCATION
LOCATION: BACK;LEG
LOCATION: LEG

## 2024-06-03 NOTE — PROGRESS NOTES
Hemoglobin has fallen to 6.2.  No active bleeding noted per nursing.    POC occult blood 5/31/2024 negative.    Patient is not tachycardic.  Blood pressure has declined somewhat overnight.    Repeat H&H stat.  Check B12, folate.  Check type and screen.  Repeat stool for occult blood.

## 2024-06-03 NOTE — PROGRESS NOTES
nadolol or carvedilol given to keep heart rate above 55 bpm patient has portal hypertension.   4. Ceftriaxone was started as empiric due to profound anemia  5. Low sodium diet. Nutritional support and multivitamins as per dietitian  6. Hemochromatosis DNA ordered given elevated iron stores  7. Therapeutic paracentesis - order placed  8. Naltrexone or baclofen as appropriate in additional to CIWA protocol to help achieve/maintain alcohol cessation  9. No NSAIDs. Acetaminophen up to 2000 mg daily is safe.  10. Medical management otherwise per primary team.      Chief Complaint: leg pain      Subjective:  tolerating meals and nutritional supplements without difficulty. No nausea, vomiting, GI bleed. Notes sensation that abdomen is getting tight again as was softer after last paracentesis. Noted to have Hgb of 6.2 this morning without evidence of active bleeding. No blood transfusions. Repeat Hgb with finding of 7.2.    ROS: Denies any fevers, chills     Patient Active Problem List   Diagnosis    COPD with acute exacerbation (HCC)    HTN (hypertension), malignant    Suspected COVID-19 virus infection    Chest pain radiating to [facility] maintenance    Hematuria, undiagnosed cause    Elevated LFTs    Type 2 diabetes mellitus with hyperglycemia, without long-term current use of insulin (HCC)    Thrombocytosis    Tobacco abuse    Hx of renal calculi    Hypokalemia    Prostate cancer (HCC)    Liver failure without hepatic coma (HCC)    Ascites due to alcoholic cirrhosis (HCC)       No Known Allergies     Current Facility-Administered Medications   Medication Dose Route Frequency    magnesium oxide (MAG-OX) tablet 400 mg  400 mg Oral BID    potassium bicarb-citric acid (EFFER-K) effervescent tablet 40 mEq  40 mEq Oral BID    cefTRIAXone (ROCEPHIN) 1,000 mg in sterile water 10 mL IV syringe  1,000 mg IntraVENous Q24H    morphine (PF) injection 2 mg  2 mg IntraVENous Q4H PRN    amLODIPine (NORVASC) tablet 5 mg  5 mg  A4C 27 16 - 34 ml/m2    Ao Root Index 1.83 cm/m2    Ascending Aorta Index 1.83 cm/m2    Est. RA Pressure 8 mmHg   CBC with Auto Differential    Collection Time: 06/03/24  1:48 AM   Result Value Ref Range    WBC 16.1 (H) 4.6 - 13.2 K/uL    RBC 2.07 (L) 4.35 - 5.65 M/uL    Hemoglobin 6.2 (L) 13.0 - 16.0 g/dL    Hematocrit 17.1 (LL) 36.0 - 48.0 %    MCV 82.6 78.0 - 100.0 FL    MCH 30.0 24.0 - 34.0 PG    MCHC 36.3 31.0 - 37.0 g/dL    RDW 14.5 11.6 - 14.5 %    Platelets 483 (H) 135 - 420 K/uL    MPV 9.9 9.2 - 11.8 FL    Nucleated RBCs 0.0 0  WBC    nRBC 0.00 0.00 - 0.01 K/uL    Neutrophils % 81 (H) 40 - 73 %    Lymphocytes % 13 (L) 21 - 52 %    Monocytes % 5 3 - 10 %    Eosinophils % 0 0 - 5 %    Basophils % 1 0 - 2 %    Immature Granulocytes % 0 0.0 - 0.5 %    Neutrophils Absolute 13.0 (H) 1.8 - 8.0 K/UL    Lymphocytes Absolute 2.1 0.9 - 3.6 K/UL    Monocytes Absolute 0.8 0.05 - 1.2 K/UL    Eosinophils Absolute 0.0 0.0 - 0.4 K/UL    Basophils Absolute 0.2 (H) 0.0 - 0.1 K/UL    Immature Granulocytes Absolute 0.0 0.00 - 0.04 K/UL    Differential Type MANUAL      Platelet Comment Increased Platelets      RBC Comment ANISOCYTOSIS  1+        WBC Comment SMUDGE CELLS SEEN     Basic Metabolic Panel    Collection Time: 06/03/24  1:48 AM   Result Value Ref Range    Sodium 133 (L) 136 - 145 mmol/L    Potassium 3.2 (L) 3.5 - 5.5 mmol/L    Chloride 100 100 - 111 mmol/L    CO2 28 21 - 32 mmol/L    Anion Gap 5 3.0 - 18 mmol/L    Glucose 101 (H) 74 - 99 mg/dL    BUN 8 7.0 - 18 MG/DL    Creatinine 1.08 0.6 - 1.3 MG/DL    BUN/Creatinine Ratio 7 (L) 12 - 20      Est, Glom Filt Rate 79 >60 ml/min/1.73m2    Calcium 7.6 (L) 8.5 - 10.1 MG/DL   Magnesium    Collection Time: 06/03/24  1:48 AM   Result Value Ref Range    Magnesium 1.1 (L) 1.6 - 2.6 mg/dL   Vitamin B12 & Folate    Collection Time: 06/03/24  1:48 AM   Result Value Ref Range    Vitamin B-12 >2000 (H) 211 - 911 pg/mL    Folate 5.1 3.10 - 17.50 ng/mL   Hepatic Function Panel

## 2024-06-03 NOTE — CARE COORDINATION
06/03/24 1413   /Social Work Whiteboard Notes   /Social Work Whiteboard RED 6/3/24 Pending EGD on 6/5, home with family assistance when stable. Family will transport. MCKENZIE CARON RN  Case Management  [phone]

## 2024-06-03 NOTE — CONSULTS
Nutrition Note    Aware of C/S for ONS ordering/management. RD seen pt on 6/1 and ordered standard high protein/high calories ONS (8oz) Visited pt this AM and pt reported like the ONS. Noted MD modified ONS order 6/3- now pt on standard 4oz. Discussed with MD - plan to switch back to standard 8oz.     Please refer to previous Nutrition Note on 6/1/24 for full assessment      Electronically signed by Jess East RD on 6/3/24 at 11:46 AM EDT    Contact: [phone]

## 2024-06-03 NOTE — PROGRESS NOTES
[facility] Hospitalist Group  Progress Note    Patient: Luc Patel Age: 59 y.o. : 1965 MR#: [id number] SSN: [id number]  Date/Time: 6/3/2024    Subjective:   Subjective:  Symptoms:  Stable.  No shortness of breath, cough, chest pain, weakness, headache, chest pressure, diarrhea or anxiety.    Diet:  Adequate intake.  No nausea or vomiting.    Activity level: Normal.    Pain:  He reports no pain.       No acute events overnight, no new concerns or complaints, vitals stable.  Overall doing fair    Review of Systems   Constitutional: Negative for chills, fever and malaise/fatigue.   HENT:  Negative for sore throat.    Eyes:  Negative for pain, photophobia and visual disturbance.   Cardiovascular:  Negative for chest pain, irregular heartbeat, palpitations and syncope.   Respiratory:  Negative for cough, shortness of breath and wheezing.    Skin:  Negative for color change, dry skin and rash.   Gastrointestinal:  Negative for abdominal pain, constipation, diarrhea, hematochezia, melena, nausea and vomiting.   Neurological:  Negative for disturbances in coordination, dizziness, focal weakness, headaches and weakness.   Psychiatric/Behavioral:  Negative for altered mental status, depression, hallucinations and suicidal ideas.       Assessment/Plan:   Ascites  Alcoholic liver disease  Alcohol abuse  Hypertension  Anemia  Tobacco use  Bilateral lower extremity edema  Portal hypertension  Mild coagulopathy  Hyperammonemia  Hypoalbuminemia      Plan  GI following, possible paracentesis today  Patient will need lifelong abstinence from alcohol  Possible EGD on   [facility] protocol    Disposition: Expected location: Home     Expected timeframe: Two or more days before discharge       Case discussed with:  [x]Patient  []Family  [x]Nursing  []Case Management  DVT Prophylaxis:  [x]Lovenox  []Hep SQ  []SCDs  []Coumadin   []On Heparin gtt   [] DOAC    Objective:   Objective:  General Appearance:      BUN 8 7.0 - 18 MG/DL    Creatinine 1.08 0.6 - 1.3 MG/DL    BUN/Creatinine Ratio 7 (L) 12 - 20      Est, Glom Filt Rate 79 >60 ml/min/1.73m2    Calcium 7.6 (L) 8.5 - 10.1 MG/DL   Magnesium    Collection Time: 06/03/24  1:48 AM   Result Value Ref Range    Magnesium 1.1 (L) 1.6 - 2.6 mg/dL   Vitamin B12 & Folate    Collection Time: 06/03/24  1:48 AM   Result Value Ref Range    Vitamin B-12 >2000 (H) 211 - 911 pg/mL    Folate 5.1 3.10 - 17.50 ng/mL   Hepatic Function Panel    Collection Time: 06/03/24  1:48 AM   Result Value Ref Range    Total Protein 6.9 6.4 - 8.2 g/dL    Albumin 1.4 (L) 3.4 - 5.0 g/dL    Globulin 5.5 (H) 2.0 - 4.0 g/dL    Albumin/Globulin Ratio 0.3 (L) 0.8 - 1.7      Total Bilirubin 1.5 (H) 0.2 - 1.0 MG/DL    Bilirubin, Direct 0.9 (H) 0.0 - 0.2 MG/DL    Alk Phosphatase 117 45 - 117 U/L    AST 73 (H) 10 - 38 U/L    ALT 22 16 - 61 U/L   Hemoglobin and Hematocrit    Collection Time: 06/03/24  6:19 AM   Result Value Ref Range    Hemoglobin 7.2 (L) 13.0 - 16.0 g/dL    Hematocrit 20.7 (L) 36.0 - 48.0 %   TYPE AND SCREEN    Collection Time: 06/03/24  6:19 AM   Result Value Ref Range    Crossmatch expiration date 06/06/2024,2146     ABO/Rh O POSITIVE     Antibody Screen NEG    Vascular duplex abdominal visceral veins/organs limited    Collection Time: 06/03/24  8:45 AM   Result Value Ref Range    Body Surface Area 1.77 m2          Signed By: Skip Hernandez MD     Amanda 3, 2024 9:28 AM

## 2024-06-03 NOTE — PROCEDURES
RADIOLOGY POST PARACENTESIS NOTE     Amanda 3, 2024       2:50 PM     Preoperative Diagnosis:   Ascites    Postoperative Diagnosis:  Same.    :  PURA Adam    Attending: Deric Petit MD     Assistant:  None.    Type of Anesthesia: 1% plain lidocaine    Procedure/Description:  US-Guided paracentesis    Findings:  Using ultrasound guidance the largest pocket of peritoneal fluid was localized and marked at the right lower quadrant.   The patient was prepped and draped in the usual fashion.  1% Lidocaine was infiltrated locally. A 5 Persian over the needle catheter was advanced into the peritoneal cavity and clear, yellow fluid was aspirated. Once fluid was easily aspirated, the needle was removed leaving the catheter in place. The catheter was connected to vacuum containers and ascitic fluid was removed.    Estimated blood Loss:  Minimal    Specimen Removed:  No     Complications: None    Condition: Stable    Impression: Successful paracentesis. 4.1 L ascites removed.    Discharge Plan:  continue present therapy    PURA Adam

## 2024-06-04 ENCOUNTER — ANESTHESIA EVENT (OUTPATIENT)
Facility: HOSPITAL | Age: 59
DRG: 433 | End: 2024-06-04
Payer: MEDICARE

## 2024-06-04 PROCEDURE — 94761 N-INVAS EAR/PLS OXIMETRY MLT: CPT

## 2024-06-04 PROCEDURE — 36415 COLL VENOUS BLD VENIPUNCTURE: CPT

## 2024-06-04 PROCEDURE — 6360000002 HC RX W HCPCS: Performed by: EMERGENCY MEDICINE

## 2024-06-04 PROCEDURE — 2580000003 HC RX 258: Performed by: INTERNAL MEDICINE

## 2024-06-04 PROCEDURE — 6370000000 HC RX 637 (ALT 250 FOR IP): Performed by: INTERNAL MEDICINE

## 2024-06-04 PROCEDURE — 6360000002 HC RX W HCPCS: Performed by: INTERNAL MEDICINE

## 2024-06-04 PROCEDURE — 2580000003 HC RX 258: Performed by: EMERGENCY MEDICINE

## 2024-06-04 PROCEDURE — 81256 HFE GENE: CPT

## 2024-06-04 PROCEDURE — 1100000000 HC RM PRIVATE

## 2024-06-04 PROCEDURE — 6370000000 HC RX 637 (ALT 250 FOR IP): Performed by: HOSPITALIST

## 2024-06-04 PROCEDURE — 99232 SBSQ HOSP IP/OBS MODERATE 35: CPT | Performed by: STUDENT IN AN ORGANIZED HEALTH CARE EDUCATION/TRAINING PROGRAM

## 2024-06-04 RX ORDER — LIDOCAINE HYDROCHLORIDE 10 MG/ML
1 INJECTION, SOLUTION EPIDURAL; INFILTRATION; INTRACAUDAL; PERINEURAL
Status: CANCELLED | OUTPATIENT
Start: 2024-06-04 | End: 2024-06-05

## 2024-06-04 RX ORDER — FAMOTIDINE 20 MG/1
20 TABLET, FILM COATED ORAL ONCE
Status: CANCELLED | OUTPATIENT
Start: 2024-06-04 | End: 2024-06-04

## 2024-06-04 RX ORDER — SODIUM CHLORIDE, SODIUM LACTATE, POTASSIUM CHLORIDE, CALCIUM CHLORIDE 600; 310; 30; 20 MG/100ML; MG/100ML; MG/100ML; MG/100ML
INJECTION, SOLUTION INTRAVENOUS CONTINUOUS
Status: CANCELLED | OUTPATIENT
Start: 2024-06-04

## 2024-06-04 RX ADMIN — MORPHINE SULFATE 2 MG: 2 INJECTION, SOLUTION INTRAMUSCULAR; INTRAVENOUS at 15:30

## 2024-06-04 RX ADMIN — Medication 15 ML: at 10:50

## 2024-06-04 RX ADMIN — MORPHINE SULFATE 2 MG: 2 INJECTION, SOLUTION INTRAMUSCULAR; INTRAVENOUS at 06:44

## 2024-06-04 RX ADMIN — WATER 1000 MG: 1 INJECTION INTRAMUSCULAR; INTRAVENOUS; SUBCUTANEOUS at 20:47

## 2024-06-04 RX ADMIN — SODIUM CHLORIDE, PRESERVATIVE FREE 10 ML: 5 INJECTION INTRAVENOUS at 20:52

## 2024-06-04 RX ADMIN — SODIUM CHLORIDE, PRESERVATIVE FREE 10 ML: 5 INJECTION INTRAVENOUS at 11:06

## 2024-06-04 RX ADMIN — Medication 100 MG: at 10:58

## 2024-06-04 RX ADMIN — Medication 400 MG: at 10:58

## 2024-06-04 RX ADMIN — MORPHINE SULFATE 2 MG: 2 INJECTION, SOLUTION INTRAMUSCULAR; INTRAVENOUS at 19:31

## 2024-06-04 RX ADMIN — MORPHINE SULFATE 2 MG: 2 INJECTION, SOLUTION INTRAMUSCULAR; INTRAVENOUS at 10:54

## 2024-06-04 RX ADMIN — AMLODIPINE BESYLATE 5 MG: 5 TABLET ORAL at 10:58

## 2024-06-04 RX ADMIN — FOLIC ACID 1 MG: 1 TABLET ORAL at 10:58

## 2024-06-04 RX ADMIN — ENOXAPARIN SODIUM 40 MG: 100 INJECTION SUBCUTANEOUS at 10:58

## 2024-06-04 RX ADMIN — SODIUM CHLORIDE, PRESERVATIVE FREE 10 ML: 5 INJECTION INTRAVENOUS at 20:48

## 2024-06-04 RX ADMIN — Medication 400 MG: at 20:47

## 2024-06-04 ASSESSMENT — PAIN SCALES - GENERAL
PAINLEVEL_OUTOF10: 7
PAINLEVEL_OUTOF10: 7
PAINLEVEL_OUTOF10: 6
PAINLEVEL_OUTOF10: 0

## 2024-06-04 ASSESSMENT — PAIN DESCRIPTION - ORIENTATION
ORIENTATION: LEFT;RIGHT
ORIENTATION: RIGHT;LEFT

## 2024-06-04 ASSESSMENT — PAIN DESCRIPTION - DESCRIPTORS
DESCRIPTORS: ACHING

## 2024-06-04 ASSESSMENT — ENCOUNTER SYMPTOMS
VOMITING: 0
PHOTOPHOBIA: 0
COLOR CHANGE: 0
NAUSEA: 0
COUGH: 0
WHEEZING: 0
SORE THROAT: 0
ABDOMINAL PAIN: 0
CONSTIPATION: 0
EYE PAIN: 0
DIARRHEA: 0
HEMATOCHEZIA: 0
SHORTNESS OF BREATH: 0

## 2024-06-04 ASSESSMENT — PAIN DESCRIPTION - PAIN TYPE: TYPE: ACUTE PAIN

## 2024-06-04 ASSESSMENT — PAIN DESCRIPTION - LOCATION
LOCATION: LEG

## 2024-06-04 ASSESSMENT — PAIN DESCRIPTION - ONSET: ONSET: GRADUAL

## 2024-06-04 ASSESSMENT — PAIN - FUNCTIONAL ASSESSMENT
PAIN_FUNCTIONAL_ASSESSMENT: ACTIVITIES ARE NOT PREVENTED

## 2024-06-04 ASSESSMENT — PAIN DESCRIPTION - FREQUENCY: FREQUENCY: CONTINUOUS

## 2024-06-04 NOTE — PLAN OF CARE
Problem: Pain  Goal: Verbalizes/displays adequate comfort level or baseline comfort level  6/4/2024 1319 by Amie Morales RN  Outcome: Progressing  6/4/2024 0022 by Maxime Leger RN  Outcome: Progressing     Problem: Safety - Adult  Goal: Free from fall injury  6/4/2024 1319 by Amie Morales RN  Outcome: Progressing  6/4/2024 0022 by Maxime Leger RN  Outcome: Progressing     Problem: Chronic Conditions and Co-morbidities  Goal: Patient's chronic conditions and co-morbidity symptoms are monitored and maintained or improved  6/4/2024 1319 by Amie Morales RN  Outcome: Progressing  6/4/2024 0022 by Maxime Leger RN  Outcome: Progressing     Problem: Nutrition Deficit:  Goal: Optimize nutritional status  6/4/2024 1319 by Amie Morales RN  Outcome: Progressing  6/4/2024 0022 by Maxime Leger RN  Outcome: Progressing

## 2024-06-04 NOTE — PLAN OF CARE
Problem: Pain  Goal: Verbalizes/displays adequate comfort level or baseline comfort level  Recent Flowsheet Documentation  Taken 6/4/2024 1600 by Amie Morales RN  Verbalizes/displays adequate comfort level or baseline comfort level:   Encourage patient to monitor pain and request assistance   Administer analgesics based on type and severity of pain and evaluate response   Assess pain using appropriate pain scale  6/4/2024 1319 by Amie Morales RN  Outcome: Progressing     Problem: Safety - Adult  Goal: Free from fall injury  6/4/2024 1319 by Amie Morales RN  Outcome: Progressing     Problem: Chronic Conditions and Co-morbidities  Goal: Patient's chronic conditions and co-morbidity symptoms are monitored and maintained or improved  6/4/2024 1319 by Amie Morales RN  Outcome: Progressing     Problem: Nutrition Deficit:  Goal: Optimize nutritional status  6/4/2024 1319 by Amie Morales RN  Outcome: Progressing

## 2024-06-04 NOTE — PROGRESS NOTES
[facility] Hospitalist Group  Progress Note    Patient: Luc Patel Age: 59 y.o. : 1965 MR#: [id number] SSN: [id number]  Date/Time: 2024    Subjective:   Subjective:  Symptoms:  Stable.  No shortness of breath, cough, chest pain, weakness, headache, chest pressure, diarrhea or anxiety.    Diet:  Adequate intake.  No nausea or vomiting.    Activity level: Normal.    Pain:  He reports no pain.       No acute events overnight, no new concerns or complaints, vitals stable.  Overall doing fair    Review of Systems   Constitutional: Negative for chills, fever and malaise/fatigue.   HENT:  Negative for sore throat.    Eyes:  Negative for pain, photophobia and visual disturbance.   Cardiovascular:  Negative for chest pain, irregular heartbeat, palpitations and syncope.   Respiratory:  Negative for cough, shortness of breath and wheezing.    Skin:  Negative for color change, dry skin and rash.   Gastrointestinal:  Negative for abdominal pain, constipation, diarrhea, hematochezia, melena, nausea and vomiting.   Neurological:  Negative for disturbances in coordination, dizziness, focal weakness, headaches and weakness.   Psychiatric/Behavioral:  Negative for altered mental status, depression, hallucinations and suicidal ideas.       Assessment/Plan:   Ascites  Alcoholic liver disease  Alcohol abuse  Hypertension  Anemia  Tobacco use  Bilateral lower extremity edema  Portal hypertension  Mild coagulopathy  Hyperammonemia  Hypoalbuminemia      Plan  GI following, possible paracentesis today  Patient will need lifelong abstinence from alcohol  Possible EGD on   [facility] protocol    Disposition: Expected location: Home     Expected timeframe: Two or more days before discharge       Case discussed with:  [x]Patient  []Family  [x]Nursing  []Case Management  DVT Prophylaxis:  [x]Lovenox  []Hep SQ  []SCDs  []Coumadin   []On Heparin gtt   [] DOAC    Objective:   Objective:  General Appearance:   Comfortable, well-appearing, in no acute distress and not in pain.    Vital signs: (most recent): Blood pressure 110/68, pulse (!) 111, temperature 98 °F (36.7 °C), temperature source Oral, resp. rate 20, height 1.651 m (5' 5\"), weight 68 kg (150 lb), SpO2 97 %.  Vital signs are normal.  No fever.    HEENT: Normal HEENT exam.    Lungs:  Normal effort and normal respiratory rate.  Breath sounds clear to auscultation.    Heart: Normal rate.  Regular rhythm.  S1 normal and S2 normal.  No murmur, gallop or friction rub.   Chest: Symmetric chest wall expansion.   Abdomen: Abdomen is soft and non-distended.  Bowel sounds are normal.   There is no abdominal tenderness.     Extremities: Normal range of motion.    Pulses: Distal pulses are intact.    Neurological: Patient is alert and oriented to person, place and time.  Normal strength.    Pupils:  Pupils are equal, round, and reactive to light.    Skin:  Warm and dry.         Labs:    Recent Results (from the past 24 hour(s))   Urinalysis    Collection Time: 06/03/24  9:40 PM   Result Value Ref Range    Color, UA DARK YELLOW      Appearance CLOUDY      Specific Gravity, UA 1.027 1.005 - 1.030      pH, Urine 5.5 5.0 - 8.0      Protein, UA TRACE (A) NEG mg/dL    Glucose, Ur Negative NEG mg/dL    Ketones, Urine TRACE (A) NEG mg/dL    Bilirubin, Urine SMALL (A) NEG      Blood, Urine Negative NEG      Urobilinogen, Urine 1.0 0.2 - 1.0 EU/dL    Nitrite, Urine Positive (A) NEG      Leukocyte Esterase, Urine TRACE (A) NEG     Urinalysis, Micro    Collection Time: 06/03/24  9:40 PM   Result Value Ref Range    WBC, UA 0 to 3 0 - 4 /hpf    RBC, UA Negative 0 - 5 /hpf    Epithelial Cells, UA Negative 0 - 5 /lpf    BACTERIA, URINE 3+ (A) NEG /hpf          Signed By: Skip Hernandez MD     June 4, 2024 4:25 PM

## 2024-06-04 NOTE — PROGRESS NOTES
Gastroenterology follow up-Progress note    Impression:  1. 58yo M with complex PMHx, notable for prostate cancer and nephrolithiasis who is admitted with new ascites. He has history of alcohol dependence and presents with alcoholic hepatitis. Certainly ascites could be from EtOH cirrhosis, but more likely to be from alcohol hepatitis given normal platelets.   - MELD-Na: 20 at 6/3/2024  - Negative hepatoma on abdominal CT and US  - SAAG >1.1 with low total protein favors portal hypertension as the cause for ascites. No SBP.  - Doppler 6/3/24 - Unable to assess the splenic vein and right hepatic vein, due to patient tolerance and suboptimal visualization     Literature is clear on use of pentoxifylline. No benefits. As for steroids, it's not entirely clear due to the poor quality of the studies. In the largest meta-analysis completed there seems to be no benefit (Stef et al. [place] Database Syst Rev. 2017;11:HH641054. Epub 2017 Nov 2). Other smaller studies show perhaps some mortality improvement in the first month and it can be tracked by the Lille score (<0.45), however there is no long term benefit. Steroids are certainly not innocuous. They have several possible complications, and should be carefully considered in patients with active bleeding or acute renal failure. The highest mortality in this group arises from infections, and steroids could play a role worsening such a picture.     2. Portal hypertension   3. Anemia, normocytic without overt GI bleed; heme negative  4. Elevated LFTs - trending down  5. Mild coagulopathy  6. Hyperammonemia  7. Hypoalbuminemia  8. Tachycardia  9. Elevated iron stores - iron sat 114%; ferritin 1517    Plan:  Lengthy discussion and reassurance with patient on the appropriate care we are providing which he is grateful for but does not like the downtime.     1. EGD with possible variceal banding scheduled 6/5/24. NPO starting 0015 6/5/24.  2. Colonoscopy recommended.

## 2024-06-04 NOTE — PLAN OF CARE
Problem: Pain  Goal: Verbalizes/displays adequate comfort level or baseline comfort level  Outcome: Progressing     Problem: Safety - Adult  Goal: Free from fall injury  Outcome: Progressing     Problem: Chronic Conditions and Co-morbidities  Goal: Patient's chronic conditions and co-morbidity symptoms are monitored and maintained or improved  Outcome: Progressing     Problem: Nutrition Deficit:  Goal: Optimize nutritional status  Outcome: Progressing

## 2024-06-05 ENCOUNTER — ANESTHESIA (OUTPATIENT)
Facility: HOSPITAL | Age: 59
DRG: 433 | End: 2024-06-05
Payer: MEDICARE

## 2024-06-05 VITALS
BODY MASS INDEX: 24.99 KG/M2 | DIASTOLIC BLOOD PRESSURE: 80 MMHG | SYSTOLIC BLOOD PRESSURE: 138 MMHG | RESPIRATION RATE: 16 BRPM | TEMPERATURE: 98.1 F | HEART RATE: 92 BPM | OXYGEN SATURATION: 95 % | WEIGHT: 150 LBS | HEIGHT: 65 IN

## 2024-06-05 LAB
ALBUMIN SERPL-MCNC: 1.6 G/DL (ref 3.4–5)
ALBUMIN/GLOB SERPL: 0.3 (ref 0.8–1.7)
ALP SERPL-CCNC: 170 U/L (ref 45–117)
ALT SERPL-CCNC: 29 U/L (ref 16–61)
ANION GAP SERPL CALC-SCNC: 6 MMOL/L (ref 3–18)
AST SERPL-CCNC: 93 U/L (ref 10–38)
BASOPHILS # BLD: 0 K/UL (ref 0–0.1)
BASOPHILS NFR BLD: 0 % (ref 0–2)
BILIRUB SERPL-MCNC: 1.1 MG/DL (ref 0.2–1)
BUN SERPL-MCNC: 11 MG/DL (ref 7–18)
BUN/CREAT SERPL: 9 (ref 12–20)
CALCIUM SERPL-MCNC: 8.3 MG/DL (ref 8.5–10.1)
CHLORIDE SERPL-SCNC: 98 MMOL/L (ref 100–111)
CO2 SERPL-SCNC: 28 MMOL/L (ref 21–32)
CREAT SERPL-MCNC: 1.17 MG/DL (ref 0.6–1.3)
DIFFERENTIAL METHOD BLD: ABNORMAL
ECHO BSA: 1.77 M2
EOSINOPHIL # BLD: 0.5 K/UL (ref 0–0.4)
EOSINOPHIL NFR BLD: 4 % (ref 0–5)
ERYTHROCYTE [DISTWIDTH] IN BLOOD BY AUTOMATED COUNT: 14.7 % (ref 11.6–14.5)
GLOBULIN SER CALC-MCNC: 5.9 G/DL (ref 2–4)
GLUCOSE BLD STRIP.AUTO-MCNC: 128 MG/DL (ref 70–110)
GLUCOSE SERPL-MCNC: 138 MG/DL (ref 74–99)
HCT VFR BLD AUTO: 20.1 % (ref 36–48)
HGB BLD-MCNC: 6.9 G/DL (ref 13–16)
HISTORY CHECK: NORMAL
IMM GRANULOCYTES # BLD AUTO: 0 K/UL (ref 0–0.04)
IMM GRANULOCYTES NFR BLD AUTO: 0 % (ref 0–0.5)
INR PPP: 1.7 (ref 0.9–1.1)
LYMPHOCYTES # BLD: 2.1 K/UL (ref 0.9–3.6)
LYMPHOCYTES NFR BLD: 16 % (ref 21–52)
MCH RBC QN AUTO: 29.1 PG (ref 24–34)
MCHC RBC AUTO-ENTMCNC: 34.3 G/DL (ref 31–37)
MCV RBC AUTO: 84.8 FL (ref 78–100)
MONOCYTES # BLD: 0.9 K/UL (ref 0.05–1.2)
MONOCYTES NFR BLD: 7 % (ref 3–10)
NEUTS SEG # BLD: 9.9 K/UL (ref 1.8–8)
NEUTS SEG NFR BLD: 73 % (ref 40–73)
NRBC # BLD: 0 K/UL (ref 0–0.01)
NRBC BLD-RTO: 0 PER 100 WBC
PLATELET # BLD AUTO: 622 K/UL (ref 135–420)
PLATELET COMMENT: ABNORMAL
PMV BLD AUTO: 9.7 FL (ref 9.2–11.8)
POTASSIUM SERPL-SCNC: 4.1 MMOL/L (ref 3.5–5.5)
PROT SERPL-MCNC: 7.5 G/DL (ref 6.4–8.2)
PROTHROMBIN TIME: 20.4 SEC (ref 11.9–14.9)
RBC # BLD AUTO: 2.37 M/UL (ref 4.35–5.65)
RBC MORPH BLD: ABNORMAL
SODIUM SERPL-SCNC: 132 MMOL/L (ref 136–145)
WBC # BLD AUTO: 13.4 K/UL (ref 4.6–13.2)

## 2024-06-05 PROCEDURE — 3600007512: Performed by: INTERNAL MEDICINE

## 2024-06-05 PROCEDURE — 2709999900 HC NON-CHARGEABLE SUPPLY: Performed by: INTERNAL MEDICINE

## 2024-06-05 PROCEDURE — 2580000003 HC RX 258: Performed by: INTERNAL MEDICINE

## 2024-06-05 PROCEDURE — 3700000000 HC ANESTHESIA ATTENDED CARE: Performed by: INTERNAL MEDICINE

## 2024-06-05 PROCEDURE — 2720000010 HC SURG SUPPLY STERILE: Performed by: INTERNAL MEDICINE

## 2024-06-05 PROCEDURE — 6360000002 HC RX W HCPCS: Performed by: INTERNAL MEDICINE

## 2024-06-05 PROCEDURE — 6360000002 HC RX W HCPCS: Performed by: NURSE ANESTHETIST, CERTIFIED REGISTERED

## 2024-06-05 PROCEDURE — 06L38CZ OCCLUSION OF ESOPHAGEAL VEIN WITH EXTRALUMINAL DEVICE, VIA NATURAL OR ARTIFICIAL OPENING ENDOSCOPIC: ICD-10-PCS | Performed by: INTERNAL MEDICINE

## 2024-06-05 PROCEDURE — 7100000011 HC PHASE II RECOVERY - ADDTL 15 MIN: Performed by: INTERNAL MEDICINE

## 2024-06-05 PROCEDURE — 85610 PROTHROMBIN TIME: CPT

## 2024-06-05 PROCEDURE — 94761 N-INVAS EAR/PLS OXIMETRY MLT: CPT

## 2024-06-05 PROCEDURE — 99239 HOSP IP/OBS DSCHRG MGMT >30: CPT | Performed by: STUDENT IN AN ORGANIZED HEALTH CARE EDUCATION/TRAINING PROGRAM

## 2024-06-05 PROCEDURE — 80053 COMPREHEN METABOLIC PANEL: CPT

## 2024-06-05 PROCEDURE — 82962 GLUCOSE BLOOD TEST: CPT

## 2024-06-05 PROCEDURE — 3600007502: Performed by: INTERNAL MEDICINE

## 2024-06-05 PROCEDURE — 85025 COMPLETE CBC W/AUTO DIFF WBC: CPT

## 2024-06-05 PROCEDURE — 6370000000 HC RX 637 (ALT 250 FOR IP): Performed by: INTERNAL MEDICINE

## 2024-06-05 PROCEDURE — 3700000001 HC ADD 15 MINUTES (ANESTHESIA): Performed by: INTERNAL MEDICINE

## 2024-06-05 PROCEDURE — 7100000010 HC PHASE II RECOVERY - FIRST 15 MIN: Performed by: INTERNAL MEDICINE

## 2024-06-05 PROCEDURE — 6370000000 HC RX 637 (ALT 250 FOR IP): Performed by: HOSPITALIST

## 2024-06-05 PROCEDURE — 36415 COLL VENOUS BLD VENIPUNCTURE: CPT

## 2024-06-05 PROCEDURE — 7100000000 HC PACU RECOVERY - FIRST 15 MIN: Performed by: INTERNAL MEDICINE

## 2024-06-05 PROCEDURE — 93976 VASCULAR STUDY: CPT | Performed by: SURGERY

## 2024-06-05 RX ORDER — SULFAMETHOXAZOLE AND TRIMETHOPRIM 800; 160 MG/1; MG/1
1 TABLET ORAL 2 TIMES DAILY
Qty: 14 TABLET | Refills: 0 | Status: SHIPPED | OUTPATIENT
Start: 2024-06-05 | End: 2024-06-12

## 2024-06-05 RX ORDER — FOLIC ACID 1 MG/1
1 TABLET ORAL DAILY
Qty: 30 TABLET | Refills: 3 | Status: SHIPPED | OUTPATIENT
Start: 2024-06-06

## 2024-06-05 RX ORDER — SODIUM CHLORIDE 9 MG/ML
INJECTION, SOLUTION INTRAVENOUS PRN
Status: DISCONTINUED | OUTPATIENT
Start: 2024-06-05 | End: 2024-06-05

## 2024-06-05 RX ORDER — THIAMINE MONONITRATE (VIT B1) 100 MG
100 TABLET ORAL DAILY
Qty: 30 TABLET | Refills: 0 | Status: SHIPPED | OUTPATIENT
Start: 2024-06-06 | End: 2024-07-06

## 2024-06-05 RX ADMIN — MORPHINE SULFATE 2 MG: 2 INJECTION, SOLUTION INTRAMUSCULAR; INTRAVENOUS at 15:59

## 2024-06-05 RX ADMIN — Medication 400 MG: at 08:41

## 2024-06-05 RX ADMIN — PROPOFOL 100 MG: 10 INJECTION, EMULSION INTRAVENOUS at 14:31

## 2024-06-05 RX ADMIN — SODIUM CHLORIDE 100 ML: 9 INJECTION, SOLUTION INTRAVENOUS at 13:51

## 2024-06-05 RX ADMIN — MORPHINE SULFATE 2 MG: 2 INJECTION, SOLUTION INTRAMUSCULAR; INTRAVENOUS at 08:44

## 2024-06-05 RX ADMIN — MORPHINE SULFATE 2 MG: 2 INJECTION, SOLUTION INTRAMUSCULAR; INTRAVENOUS at 00:38

## 2024-06-05 RX ADMIN — SODIUM CHLORIDE, PRESERVATIVE FREE 10 ML: 5 INJECTION INTRAVENOUS at 08:51

## 2024-06-05 RX ADMIN — Medication 100 MG: at 08:41

## 2024-06-05 RX ADMIN — ENOXAPARIN SODIUM 40 MG: 100 INJECTION SUBCUTANEOUS at 08:44

## 2024-06-05 RX ADMIN — Medication 15 ML: at 08:43

## 2024-06-05 RX ADMIN — SODIUM CHLORIDE, PRESERVATIVE FREE 10 ML: 5 INJECTION INTRAVENOUS at 08:45

## 2024-06-05 RX ADMIN — FOLIC ACID 1 MG: 1 TABLET ORAL at 08:41

## 2024-06-05 RX ADMIN — PROPOFOL 50 MG: 10 INJECTION, EMULSION INTRAVENOUS at 14:35

## 2024-06-05 ASSESSMENT — PAIN DESCRIPTION - PAIN TYPE
TYPE: ACUTE PAIN

## 2024-06-05 ASSESSMENT — PAIN SCALES - GENERAL
PAINLEVEL_OUTOF10: 7
PAINLEVEL_OUTOF10: 0
PAINLEVEL_OUTOF10: 7
PAINLEVEL_OUTOF10: 6
PAINLEVEL_OUTOF10: 0
PAINLEVEL_OUTOF10: 5
PAINLEVEL_OUTOF10: 7

## 2024-06-05 ASSESSMENT — PAIN DESCRIPTION - LOCATION
LOCATION: LEG
LOCATION: LEG
LOCATION: ABDOMEN
LOCATION: LEG
LOCATION: ABDOMEN

## 2024-06-05 ASSESSMENT — PAIN - FUNCTIONAL ASSESSMENT
PAIN_FUNCTIONAL_ASSESSMENT: ACTIVITIES ARE NOT PREVENTED
PAIN_FUNCTIONAL_ASSESSMENT: ACTIVITIES ARE NOT PREVENTED
PAIN_FUNCTIONAL_ASSESSMENT: PREVENTS OR INTERFERES SOME ACTIVE ACTIVITIES AND ADLS
PAIN_FUNCTIONAL_ASSESSMENT: 0-10
PAIN_FUNCTIONAL_ASSESSMENT: 0-10
PAIN_FUNCTIONAL_ASSESSMENT: ACTIVITIES ARE NOT PREVENTED
PAIN_FUNCTIONAL_ASSESSMENT: ACTIVITIES ARE NOT PREVENTED

## 2024-06-05 ASSESSMENT — PAIN DESCRIPTION - ORIENTATION
ORIENTATION: RIGHT;LEFT
ORIENTATION: RIGHT
ORIENTATION: RIGHT;LEFT
ORIENTATION: RIGHT;LEFT
ORIENTATION: RIGHT

## 2024-06-05 ASSESSMENT — PAIN DESCRIPTION - FREQUENCY
FREQUENCY: CONTINUOUS

## 2024-06-05 ASSESSMENT — PAIN DESCRIPTION - ONSET
ONSET: ON-GOING
ONSET: ON-GOING
ONSET: GRADUAL
ONSET: ON-GOING
ONSET: ON-GOING

## 2024-06-05 ASSESSMENT — PAIN DESCRIPTION - DESCRIPTORS
DESCRIPTORS: ACHING
DESCRIPTORS: THROBBING
DESCRIPTORS: ACHING
DESCRIPTORS: ACHING;SORE;CRUSHING
DESCRIPTORS: ACHING

## 2024-06-05 ASSESSMENT — COPD QUESTIONNAIRES: CAT_SEVERITY: MODERATE

## 2024-06-05 ASSESSMENT — LIFESTYLE VARIABLES: SMOKING_STATUS: 1

## 2024-06-05 NOTE — DISCHARGE SUMMARY
Discharge Summary    Patient: Luc Patel MRN: [id number]  CSN: [id number]    YOB: 1965  Age: 59 y.o.  Sex: male    DOA: 5/31/2024 LOS:  LOS: 5 days   Discharge Date: 6/5/2024      Admission Diagnosis: Bilateral lower extremity edema [R60.0]  Ascites due to alcoholic cirrhosis (HCC) [K70.31]  Liver failure without hepatic coma (HCC) [K72.90]  Acute liver failure without hepatic coma [K72.00]  Anemia, unspecified type [D64.9]  Alcoholic cirrhosis, unspecified whether ascites present (HCC) [K70.30]    Discharge Diagnosis: Ascites  Alcoholic liver disease  Alcohol abuse  Hypertension  Anemia  Tobacco use  Bilateral lower extremity edema  Portal hypertension  Mild coagulopathy  Hyperammonemia  Hypoalbuminemia    Discharge Condition: Stable    Discharge Disposition: Home    PHYSICAL EXAM    Visit Vitals  /80   Pulse 92   Temp 98.1 °F (36.7 °C) (Oral)   Resp 16   Ht 1.651 m (5' 5\")   Wt 68 kg (150 lb)   SpO2 95%   BMI 24.96 kg/m²       HEENT: Normal HEENT exam.    Lungs:  Normal effort and normal respiratory rate.  Breath sounds clear to auscultation.    Heart: Normal rate.  Regular rhythm.  S1 normal and S2 normal.  No murmur, gallop or friction rub.   Chest: Symmetric chest wall expansion.   Abdomen: Abdomen is soft and non-distended.  Bowel sounds are normal.   There is no abdominal tenderness.     Extremities: Normal range of motion.    Pulses: Distal pulses are intact.    Neurological: Patient is alert and oriented to person, place and time.  Normal strength.    Pupils:  Pupils are equal, round, and reactive to light.    Skin:  Warm and dry.      Hospital Course By Problem:   Patient presented to the emergency room with increasing abdominal girth and bilateral lower extremity edema.  Patient worked up in the emergency room including labs and imaging as appropriate.  Patient found to have ascites, with concern for possible SBP and as such was admitted for paracentesis workup and

## 2024-06-05 NOTE — PLAN OF CARE
Problem: Pain  Goal: Verbalizes/displays adequate comfort level or baseline comfort level  6/5/2024 1656 by Melissa Wayne RN  Outcome: Progressing  6/5/2024 1343 by Irasema Edouard RN  Outcome: Progressing     Problem: Safety - Adult  Goal: Free from fall injury  6/5/2024 1656 by Melissa Wayne RN  Outcome: Progressing  6/5/2024 1343 by Irasema Edouard RN  Outcome: Progressing     Problem: Chronic Conditions and Co-morbidities  Goal: Patient's chronic conditions and co-morbidity symptoms are monitored and maintained or improved  6/5/2024 1656 by Melissa Wayne RN  Outcome: Progressing  6/5/2024 1343 by Irasema Edouard RN  Outcome: Progressing     Problem: Nutrition Deficit:  Goal: Optimize nutritional status  6/5/2024 1656 by Melissa Wayne RN  Outcome: Progressing  6/5/2024 1343 by Iraseam Edouard RN  Outcome: Progressing

## 2024-06-05 NOTE — ANESTHESIA POSTPROCEDURE EVALUATION
Department of Anesthesiology  Postprocedure Note    Patient: Luc Patel  MRN: [id number]  YOB: 1965  Date of evaluation: 6/5/2024    Procedure Summary       Date: 06/05/24 Room / Location: [facility] ENDO 02 / [facility] ENDOSCOPY    Anesthesia Start: 1424 Anesthesia Stop: 1449    Procedure: ESOPHAGOGASTRODUODENOSCOPY WITH BANDING (Upper GI Region) Diagnosis:       Portal hypertension (HCC)      Anemia, unspecified type      (Portal hypertension (HCC) [K76.6])      (Anemia, unspecified type [D64.9])    Surgeons: Satish Coburn MD Responsible Provider: Marin Alves MD    Anesthesia Type: MAC ASA Status: 3            Anesthesia Type: MAC    New Phase I: New Score: 9    New Phase II:      Anesthesia Post Evaluation    Patient location during evaluation: PACU  Patient participation: complete - patient participated  Level of consciousness: awake  Airway patency: patent  Nausea & Vomiting: no nausea  Cardiovascular status: blood pressure returned to baseline  Respiratory status: acceptable  Hydration status: euvolemic  Pain management: adequate    No notable events documented.

## 2024-06-05 NOTE — PERIOP NOTE
TRANSFER - IN REPORT:    Verbal report received from BRENT Wan on Luc Patel  being received from 4N for ordered procedure      Report consisted of patient's Situation, Background, Assessment and   Recommendations(SBAR).     Information from the following report(s) Nurse Handoff Report was reviewed with the receiving nurse.    Opportunity for questions and clarification was provided.      Assessment completed upon patient's arrival to unit and care assumed.

## 2024-06-05 NOTE — PERIOP NOTE
TRANSFER - OUT REPORT:    Verbal report given to BRENT Wan on Luc Patel  being transferred to  for routine progression of patient care       Report consisted of patient's Situation, Background, Assessment and   Recommendations(SBAR).     Information from the following report(s) Nurse Handoff Report was reviewed with the receiving nurse.           Lines:   Peripheral IV 05/31/24 Distal;Right;Anterior Cephalic (Active)   Site Assessment Clean, dry & intact 06/05/24 0856   Line Status Flushed;Capped 06/05/24 0856   Line Care Cap changed 06/05/24 0856   Phlebitis Assessment No symptoms 06/05/24 0856   Infiltration Assessment 0 06/05/24 0856   Alcohol Cap Used Yes 06/05/24 0856   Dressing Status Clean, dry & intact 06/05/24 0856   Dressing Type Transparent 06/05/24 0856        Opportunity for questions and clarification was provided.      Patient transported with:  Tech

## 2024-06-05 NOTE — CARE COORDINATION
06/05/24 1604   IMM Letter   IMM Letter given to Patient/Family/Significant other/Guardian/POA/by: Aminata Muñoz   IMM Letter date given: 06/05/24   IMM Letter time given: 1605     Aminata CARON,RN, [facility]  Case Management  [phone]

## 2024-06-05 NOTE — PROCEDURES
[phone]      [address] [address]     Esophagogastroduodenoscopy Procedure Note    Luc Patel  1965  [id number]    Indication:  Cirrhosis for Varices Evaluation possible banding.    Date of Procedure: 06/05/24    : Satish Coburn MD    Assistant(s): Circulator: Jerad Lynn RN; Yoli Anand RN    Referring Provider:  Berenice Steinberg MD    Anesthesia/Sedation:  MAC anesthesia Propofol      Procedure Details     After infomed consent was obtained for the procedure, with all risks and benefits of procedure explained the patient was taken to the endoscopy suite and placed in the left lateral decubitus position.  Following sequential administration of sedation as per above, the endoscope was inserted into the mouth and advanced under direct vision to third portion of the duodenum.  A careful inspection was made as the gastroscope was withdrawn, including a retroflexed view of the proximal stomach; findings and interventions are described below.      Findings:   Esophagus:Two cords of Grade I varices but with \" red Zee\" signs banded. Two bands were applied.  Stomach: Portal gastropathy  Duodenum/jejunum: normal    Therapies:  variceal ablation performed with  2 of bands placed    Specimens: * No specimens in log *           Complications:   None; patient tolerated the procedure well.    Devices/implants/grafts/tissues/prosthesis: Bands placed as above.    EBL:  None.           Impression:     Portal gastropathy. Small esophageal varices banded due to presence of \" red zee \" sign. Small varices.    Recommendations:  -Continue acid suppression., -Regular diet. Office follow up in 2 months    Satish Coburn MD  6/5/2024  2:47 PM

## 2024-06-05 NOTE — PROGRESS NOTES
Gastroenterology follow up-Progress note    Impression:  1. 58yo M with complex PMHx, notable for prostate cancer and nephrolithiasis who is admitted with new ascites. He has history of alcohol dependence and presents with alcoholic hepatitis. Certainly ascites could be from EtOH cirrhosis, but more likely to be from alcohol hepatitis in absent of thrombocytopenia.  - MELD-Na: 19 at 6/3/2024  - Negative hepatoma on abdominal CT and US  - SAAG >1.1 with low total protein favors portal hypertension as the cause for ascites. No SBP.  - Doppler 6/3/24 - Unable to assess the splenic vein and right hepatic vein, due to patient tolerance and suboptimal visualization     Literature is clear on use of pentoxifylline. No benefits. As for steroids, it's not entirely clear due to the poor quality of the studies. In the largest meta-analysis completed there seems to be no benefit (Stef et al. [place] Database Syst Rev. 2017;11:NF006587. Epub 2017 Nov 2). Other smaller studies show perhaps some mortality improvement in the first month and it can be tracked by the Lille score (<0.45), however there is no long term benefit. Steroids are certainly not innocuous. They have several possible complications, and should be carefully considered in patients with active bleeding or acute renal failure. The highest mortality in this group arises from infections, and steroids could play a role worsening such a picture.     2. Portal hypertension   3. Anemia, normocytic without overt GI bleed; heme negative  4. Elevated LFTs - trending down  5. Mild coagulopathy  6. Hyperammonemia  7. Hypoalbuminemia  8. Tachycardia  9. Elevated iron stores - iron sat 114%; ferritin 1517    Plan:  1. EGD with possible variceal banding scheduled 6/5/24. NPO starting 0015 6/5/24.  2. Colonoscopy recommended. Inpatient vs outpatient based on patient's hospital course.  3. Nonselective betablocker prefer such as nadolol or carvedilol given to keep heart rate  Or    LORazepam (ATIVAN) tablet 4 mg  4 mg Oral Q1H PRN    Or    LORazepam (ATIVAN) injection 4 mg  4 mg IntraVENous Q1H PRN    thiamine mononitrate tablet 100 mg  100 mg Oral Daily          Objective:  General: awake, alert, no distress   Neck: supple and trachea normal   Cardiovascular: normal rate   Pulmonary/Chest Wall: unlabored respiratory effort   Abdominal: non-acute, distended       /63   Pulse 100   Temp 98.3 °F (36.8 °C) (Oral)   Resp 16   Ht 1.651 m (5' 5\")   Wt 68 kg (150 lb)   SpO2 94%   BMI 24.96 kg/m²       Intake/Output Summary (Last 24 hours) at 6/5/2024 0942  Last data filed at 6/5/2024 0013  Gross per 24 hour   Intake 240 ml   Output --   Net 240 ml         Data Review  No results found for this or any previous visit (from the past 24 hour(s)).        Radiology    XR Results (most recent):   US GUIDED PARACENTESIS  Narrative: This procedure was performed in its entirety by a Physician Assistant.    Procedure: Ultrasound-guided paracentesis.    : Yessica Ruiz PA-C  Attending Physician: Dr. Petit    Assistant: None    Specimen: None requested    EBL: Minimal    Indications: Ascites    Anesthesia: 1% lidocaine locally    Implants: None    Complications: None    Technique: Using ultrasound guidance a large pocket of peritoneal fluid was  localized and marked in the right lower quadrant. The patient was prepped and  draped in the usual sterile fashion and sterile barrier technique was utilized.  Maximum sterile barrier technique was used.  1% lidocaine was infiltrated  locally. A 5 [place] over-the-needle catheter was advanced into the peritoneal  cavity and light yellow colored fluid was aspirated. Once the fluid was easily  aspirated the needle was removed leaving the catheter in place. The catheter was  connected to vacuum containers and ascites was removed. Ultrasound images were  saved for documentation.    The patient tolerated the procedure well.  Impression: 1.

## 2024-06-05 NOTE — ANESTHESIA PRE PROCEDURE
Department of Anesthesiology  Preprocedure Note       Name:  Luc Patel   Age:  59 y.o.  :  1965                                          MRN:  [id number]         Date:  2024      Surgeon: Surgeon(s):  Satish Coburn MD    Procedure: Procedure(s):  ESOPHAGOGASTRODUODENOSCOPY POSSIBLE BANDING    Medications prior to admission:   Prior to Admission medications    Medication Sig Start Date End Date Taking? Authorizing Provider   ALLOPURINOL PO Take by mouth    Automatic Reconciliation, Ar   albuterol sulfate HFA (PROVENTIL;VENTOLIN;PROAIR) 108 (90 Base) MCG/ACT inhaler Inhale 2 puffs into the lungs every 4 hours as needed 2/10/21   Automatic Reconciliation, Ar   amLODIPine (NORVASC) 5 MG tablet take 1 tablet by mouth once daily 5/3/21   Automatic Reconciliation, Ar   diazePAM (VALIUM) 10 MG tablet Take 1 tablet by mouth 1 hour prior to imaging  Patient not taking: Reported on 22   Automatic Reconciliation, Ar   ergocalciferol (ERGOCALCIFEROL) 1.25 MG (58077 UT) capsule Take 50,000 Units by mouth every 7 days  Patient not taking: Reported on 2024   Automatic Reconciliation, Ar   mometasone (ASMANEX) 220 MCG/ACT AEPB inhaler Inhale 220 mcg into the lungs daily  Patient not taking: Reported on 19   Automatic Reconciliation, Ar   nicotine polacrilex (COMMIT) 2 MG lozenge Take 2 mg by mouth  Patient not taking: Reported on 19   Automatic Reconciliation, Ar   sertraline (ZOLOFT) 25 MG tablet Take 25 mg by mouth daily  Patient not taking: Reported on 2024 10/10/22   Automatic Reconciliation, Ar   tiotropium-olodaterol (STIOLTO) 2.5-2.5 MCG/ACT AERS Inhale 2 puffs into the lungs daily  Patient not taking: Reported on 19   Automatic Reconciliation, Ar       Current medications:    Current Facility-Administered Medications   Medication Dose Route Frequency Provider Last Rate Last Admin    magnesium oxide (MAG-OX) tablet 400 mg  400

## 2024-06-05 NOTE — DISCHARGE INSTRUCTIONS
instructions adapted under license by [employer]. If you have questions about a medical condition or this instruction, always ask your healthcare professional. [employer] disclaims any warranty or liability for your use of this information.

## 2024-06-05 NOTE — SIGNIFICANT EVENT
Spoke with patient regarding consent for blood transfusion. Patient overall upset at this time, wanting time to think on blood transfusion. Explained pros cons alternatives and any consequences of getting and abstaining from transfusion. Patient reiterates he would like time to think on it. Have kept the prep order, but Dc'd the transfuse order for the time being. Have let the nurse know not to transfuse at this time.    Skip Hernandez MD   6/5/2024   [facility]   12:01 PM

## 2024-06-05 NOTE — CARE COORDINATION
Discharge order noted for today. Orders received. No needs identified at this time. Case management remains available as needed.    Aminata Muñoz BSN,RN, [facility]  Case Management  [phone]

## 2024-06-05 NOTE — PROGRESS NOTES
Completed discharge instructions with patient. Provided patient opportunity to ask any questions. All questions answered to patient's satisfaction. Patient discharged to home. Patient calling family for transport home.

## 2024-06-06 LAB
BACTERIA SPEC CULT: ABNORMAL
CC UR VC: ABNORMAL
SERVICE CMNT-IMP: ABNORMAL

## 2024-06-07 LAB
ABO + RH BLD: NORMAL
BLD PROD TYP BPU: NORMAL
BLOOD BANK DISPENSE STATUS: NORMAL
BLOOD GROUP ANTIBODIES SERPL: NORMAL
BPU ID: NORMAL
CALLED TO: NORMAL
CROSSMATCH RESULT: NORMAL
SPECIMEN EXP DATE BLD: NORMAL
UNIT DIVISION: 0

## 2024-06-07 NOTE — PROGRESS NOTES
Physician Progress Note      PATIENT:               TABATHA FARIAS  [facility] #:                  [id number]  :                       1965  ADMIT DATE:       2024 4:03 AM  DISCH DATE:        2024 6:57 PM  RESPONDING  PROVIDER #:        Thad Zelaya MD          QUERY TEXT:    Patient admitted with alcoholic liver disease. Noted to have RD documentation   of weight loss, dietician assessment with malnutrition diagnosis. If possible,   please document in progress notes and discharge summary if you are evaluating   and /or treating any of the following:    The medical record reflects the following:  Risk Factors: alcoholic liver disease    Clinical Indicators: RD Note: Moderate malnutrition (24 1240)  Context:  Chronic Illness  Findings of the 6 clinical characteristics of malnutrition:  Energy Intake:  Mild decrease in energy intake (Comment) (Varies po intake   PTA)  Weight Loss:  Unable to assess  Body Fat Loss:  Mild body fat loss Orbital, Triceps, Buccal region  Muscle Mass Loss:  Mild muscle mass loss Temples (temporalis), Clavicles   (pectoralis & deltoids), Hand (interosseous)  Fluid Accumulation:  Mild Extremities, Ascites (+2, pitting)   Strength:  Normal  strength      Treatment: Nutrition consulted  1. Diet as ordered, Add Ensure Plus High Protein (provides 350 kcal, 20g   protein) Twice daily to maximize calories/protein intake opportunity  2. Provides MVT/MN daily  3. Continue to monitor tolerance of PO, compliance of oral supplements,   weight, labs, and plan of care during admission.    ASPEN Criteria:    https://aspenjournals.onlinelibrary.scanlon.com/doi/full/10.1177/272015141672051  5    There is a pending query in Epic ([employer]) on this patient. Please review and   provide your response. Thank you for your time and assistance. It is   appreciated.  Please call if you have any questions or need assistance. I can also be   reached via [employer] or [employer] #

## 2024-06-26 LAB
HFE GENE MUT ANL BLD/T: NORMAL
REVIEWED BY: NORMAL

## 2024-09-12 ENCOUNTER — TELEPHONE (OUTPATIENT)
Facility: HOSPITAL | Age: 59
End: 2024-09-12

## 2024-10-12 NOTE — ED PROVIDER NOTES
5:12 PM : Pt care assumed from Dr. Burnett  ,ED provider. History of patient complaint(s), available diagnostic reports and current treatment plan has been discussed thoroughly.   Bedside rounding on patient occured : Yes  Intended disposition of patient : tbd  Pending diagnostics reports and/or labs (please list): CT, labs.    Accepted turnover from Dr. Dubon, patient pending CT and lab follow-up for lower extremity swelling and weakness.    I independently examined and interviewed the patient.  He notes that last night he was \"hanging out with his buddies, having some drinks\" and went [place] the casino.  Afterwards he noted that his lower extremity were very weak and swollen, he was having difficulty walking because of the weakness and swelling so came to the emergency department.  He notes he has chronic diarrhea, his whole life sometimes sees blood in the diarrhea and has seen some blood recently but not more than usual.  Denies any abdominal pain, does have intermittent nausea and does not have much of an appetite so has not eating much in the way of solid foods.  He does drink at least a sixpack a day for many years and has never tried to quit drinking, has never been told that he has cirrhosis.  Does relate that he has a history of hypertension and diabetes but is no longer on diabetes medications, does have a history of kidney stones and has a stone that is supposed to be removed by urology but has not been able to schedule this procedure.  Currently he is only complaining of lower extremity swelling and weakness, is also asking for food.    On exam, abdomen is not tense, soft; regular rate and rhythm, clear lung sounds with diminished on the left base, PERRL with a very slight hint of icteric sclera.  OP within normal limits, bilateral lower extremities are edematous 1+ pitting edema, normal pulses throughout.    Review of his labs and imaging, patient has cirrhosis per CT and looks like he is entering acute  liver failure as his INR is elevated protein is low, bili is elevated, and he has new severe anemia as of yet of unknown cause.    D/w GI who will see patient in ER, also discussed with hospitalist and will admit for acute severe anemia and liver failure.    Recent Results (from the past 12 hour(s))   Ammonia    Collection Time: 05/31/24  8:33 AM   Result Value Ref Range    Ammonia 39 (H) 11 - 32 UMOL/L   Albumin, Body Fluid    Collection Time: 05/31/24  2:30 PM   Result Value Ref Range    Fluid Type ASCITIC FLUID      Albumin, Fluid <0.6 g/dL   Cell Count with Differential, Body Fluid    Collection Time: 05/31/24  2:30 PM   Result Value Ref Range    Specimen ASCITIC FLUID      Color, Fluid YELLOW      Appearance, Fluid HAZY      RBC, Fluid <2,000 (A) NRRE /cu mm    Total Nucleated Cell Count 109 (A) NRRE /cu mm    Polys, Fluid PENDING %    Bands, Fluid PENDING %    Lymphocytes, Body Fluid PENDING %    Monocyte Count, Fluid PENDING %    Eos, Fluid PENDING %   Protein, Body Fluid    Collection Time: 05/31/24  2:30 PM   Result Value Ref Range    Fluid Type ASCITIC FLUID      Total Protein, Body Fluid <2.0 g/dL   Vascular duplex lower extremity venous bilateral    Collection Time: 05/31/24  3:18 PM   Result Value Ref Range    Body Surface Area 1.77 m2           ED Course as of 05/31/24 1712   Fri May 31, 2024   0424 Normal sinus rhythm on ECG, no ST segment elevations or depressions, no T wave inversions, T wave flattening in lead III, no Q waves, no bundle branch block pattern, mildly prolonged QTc interval [JM]   0502 proBNP level within normal limits, serum troponin also within normal limits, normal serum lipase level, AST is elevated greater than ALT, total bilirubin also elevated at 2 with mostly direct hyperbilirubinemia, albumin is diminished.  Patient also acutely anemic [JM]   0544 Hemoccult negative [JM]   0607 Total Bilirubin(!): 2.0 [LK]      ED Course User Index  [JM] Michel Burnett DO  [LK] Sachin Carmen  no dizziness/no nausea

## (undated) PROCEDURE — 06L38CZ OCCLUSION OF ESOPHAGEAL VEIN WITH EXTRALUMINAL DEVICE, VIA NATURAL OR ARTIFICIAL OPENING ENDOSCOPIC: ICD-10-PCS

## (undated) PROCEDURE — 0W9G3ZZ DRAINAGE OF PERITONEAL CAVITY, PERCUTANEOUS APPROACH: ICD-10-PCS

## (undated) DEVICE — SYRINGE MED 25GA 3ML L5/8IN SUBQ PLAS W/ DETACH NDL SFTY

## (undated) DEVICE — UNDERPAD INCONT W23XL36IN STD BLU POLYPR BK FLUF SFT

## (undated) DEVICE — CATHETER SUCT TR FL TIP 14FR W/ O CTRL

## (undated) DEVICE — LIGATOR ENDOSCP L2.8MM DIA8.6-11.5MM MULT BND SPEEDBAND

## (undated) DEVICE — SYRINGE MED 50ML LUERSLIP TIP

## (undated) DEVICE — ENDOSCOPY PUMP TUBING/ CAP SET: Brand: ERBE

## (undated) DEVICE — CANNULA NSL AD TBNG L14FT STD PVC O2 CRV CONN NONFLARED NSL

## (undated) DEVICE — GAUZE,SPONGE,4"X4",16PLY,STRL,LF,10/TRAY: Brand: MEDLINE

## (undated) DEVICE — LINER SUCT CANSTR 3000CC PLAS SFT PRE ASSEMB W/OUT TBNG W/

## (undated) DEVICE — YANKAUER,SMOOTH HANDLE,HIGH CAPACITY: Brand: MEDLINE INDUSTRIES, INC.

## (undated) DEVICE — BASIN EMSIS 16OZ GRAPHITE PLAS KID SHP MOLD GRAD FOR ORAL

## (undated) DEVICE — STERILE POLYISOPRENE POWDER-FREE SURGICAL GLOVES: Brand: PROTEXIS

## (undated) DEVICE — SOLUTION IRRIG 1000ML H2O STRL BLT

## (undated) DEVICE — BITE BLOCK ENDOSCP UNIV AD 6 TO 9.4 MM

## (undated) DEVICE — MEDI-VAC NON-CONDUCTIVE SUCTION TUBING: Brand: CARDINAL HEALTH